# Patient Record
Sex: FEMALE | Race: WHITE | NOT HISPANIC OR LATINO | Employment: UNEMPLOYED | URBAN - METROPOLITAN AREA
[De-identification: names, ages, dates, MRNs, and addresses within clinical notes are randomized per-mention and may not be internally consistent; named-entity substitution may affect disease eponyms.]

---

## 2017-07-03 RX ORDER — CIPROFLOXACIN 250 MG/1
500 TABLET, FILM COATED ORAL EVERY 12 HOURS
Status: DISCONTINUED | OUTPATIENT
Start: 2017-07-03 | End: 2017-07-03

## 2017-07-03 RX ORDER — CIPROFLOXACIN 500 MG/1
500 TABLET ORAL EVERY 12 HOURS
Qty: 28 TABLET | Refills: 0 | Status: SHIPPED | OUTPATIENT
Start: 2017-07-03 | End: 2017-07-17

## 2017-09-05 ENCOUNTER — TELEPHONE (OUTPATIENT)
Dept: SURGERY | Facility: CLINIC | Age: 37
End: 2017-09-05

## 2017-09-05 NOTE — TELEPHONE ENCOUNTER
----- Message from Ernestina Real MA sent at 9/5/2017 10:35 AM CDT -----  Contact: Home: [phone]   Pt has finished her antibiotics and still has urgency and pressure in her bowels.     Home: [phone]

## 2017-09-29 ENCOUNTER — TELEPHONE (OUTPATIENT)
Dept: ENDOSCOPY | Facility: HOSPITAL | Age: 37
End: 2017-09-29

## 2017-09-29 ENCOUNTER — OFFICE VISIT (OUTPATIENT)
Dept: SURGERY | Facility: CLINIC | Age: 37
End: 2017-09-29
Payer: COMMERCIAL

## 2017-09-29 VITALS
HEART RATE: 75 BPM | SYSTOLIC BLOOD PRESSURE: 110 MMHG | WEIGHT: 190.69 LBS | DIASTOLIC BLOOD PRESSURE: 65 MMHG | BODY MASS INDEX: 37.44 KG/M2 | HEIGHT: 60 IN

## 2017-09-29 DIAGNOSIS — K91.850 POUCHITIS: Primary | ICD-10-CM

## 2017-09-29 DIAGNOSIS — R19.8 RECTAL PRESSURE: Primary | ICD-10-CM

## 2017-09-29 DIAGNOSIS — K91.850 ILEAL POUCHITIS: Primary | ICD-10-CM

## 2017-09-29 PROCEDURE — 99213 OFFICE O/P EST LOW 20 MIN: CPT | Mod: S$GLB,,, | Performed by: COLON & RECTAL SURGERY

## 2017-09-29 PROCEDURE — 99999 PR PBB SHADOW E&M-EST. PATIENT-LVL III: CPT | Mod: PBBFAC,,, | Performed by: COLON & RECTAL SURGERY

## 2017-09-29 RX ORDER — TRAMADOL HYDROCHLORIDE 50 MG/1
50 TABLET ORAL EVERY 6 HOURS PRN
Qty: 50 TABLET | Refills: 0 | Status: SHIPPED | OUTPATIENT
Start: 2017-09-29 | End: 2017-10-09

## 2017-09-29 NOTE — PROGRESS NOTES
Subjective:       Patient ID: Cara Manriquez is a 37 y.o. female.    Chief Complaint: Abdominal Pain (Pt reports an frequent urge to make bowel movements, pressure and burning sensation in the abdomen.)    HPI established patient.  History of ileal anal pouch anastomosis for UC.  She's had episodes of pouchitis previously which typically respond to single agent antibiotics.  She was recently placed on Cipro but has had ongoing pelvic discomfort and change in bowel function.    Review of Systems   Constitutional: Negative for chills and fever.   Respiratory: Negative for cough.    Genitourinary: Negative for dysuria and urgency.   Neurological: Negative for seizures and numbness.       Objective:      Physical Exam   Constitutional: She is oriented to person, place, and time. She appears well-developed and well-nourished.   Eyes: Conjunctivae are normal.   Abdominal: Soft. She exhibits no mass. There is no tenderness. No hernia.   Musculoskeletal: Normal range of motion. She exhibits no edema.   Neurological: She is alert and oriented to person, place, and time.       Assessment:       1. Ileal pouchitis        Plan:       She prefers to have pouchoscopy with sedation.  Because her symptoms are not typical of her pouchitis previously I have also suggested pelvic MRI

## 2017-10-05 ENCOUNTER — ANESTHESIA EVENT (OUTPATIENT)
Dept: ENDOSCOPY | Facility: HOSPITAL | Age: 37
End: 2017-10-05
Payer: COMMERCIAL

## 2017-10-05 ENCOUNTER — HOSPITAL ENCOUNTER (OUTPATIENT)
Facility: HOSPITAL | Age: 37
Discharge: HOME | End: 2017-10-05
Attending: COLON & RECTAL SURGERY | Admitting: COLON & RECTAL SURGERY
Payer: COMMERCIAL

## 2017-10-05 ENCOUNTER — SURGERY (OUTPATIENT)
Age: 37
End: 2017-10-05

## 2017-10-05 ENCOUNTER — HOSPITAL ENCOUNTER (OUTPATIENT)
Dept: RADIOLOGY | Facility: HOSPITAL | Age: 37
Discharge: HOME | End: 2017-10-05
Attending: COLON & RECTAL SURGERY
Payer: COMMERCIAL

## 2017-10-05 ENCOUNTER — ANESTHESIA (OUTPATIENT)
Dept: ENDOSCOPY | Facility: HOSPITAL | Age: 37
End: 2017-10-05
Payer: COMMERCIAL

## 2017-10-05 VITALS
HEIGHT: 60 IN | HEART RATE: 72 BPM | RESPIRATION RATE: 20 BRPM | OXYGEN SATURATION: 100 % | TEMPERATURE: 98 F | BODY MASS INDEX: 36.52 KG/M2 | WEIGHT: 186 LBS | DIASTOLIC BLOOD PRESSURE: 78 MMHG | SYSTOLIC BLOOD PRESSURE: 100 MMHG | RESPIRATION RATE: 18 BRPM

## 2017-10-05 DIAGNOSIS — R19.8 RECTAL PRESSURE: ICD-10-CM

## 2017-10-05 DIAGNOSIS — K91.850 ILEAL POUCHITIS: Primary | ICD-10-CM

## 2017-10-05 DIAGNOSIS — K91.850 POUCHITIS: ICD-10-CM

## 2017-10-05 LAB
B-HCG UR QL: NEGATIVE
CTP QC/QA: YES

## 2017-10-05 PROCEDURE — 63600175 PHARM REV CODE 636 W HCPCS: Performed by: NURSE ANESTHETIST, CERTIFIED REGISTERED

## 2017-10-05 PROCEDURE — 44386 ENDOSCOPY BOWEL POUCH/BIOP: CPT | Performed by: COLON & RECTAL SURGERY

## 2017-10-05 PROCEDURE — 37000009 HC ANESTHESIA EA ADD 15 MINS: Performed by: COLON & RECTAL SURGERY

## 2017-10-05 PROCEDURE — 25000003 PHARM REV CODE 250: Performed by: COLON & RECTAL SURGERY

## 2017-10-05 PROCEDURE — 88305 TISSUE EXAM BY PATHOLOGIST: CPT | Mod: 26,,, | Performed by: PATHOLOGY

## 2017-10-05 PROCEDURE — 88305 TISSUE EXAM BY PATHOLOGIST: CPT | Performed by: PATHOLOGY

## 2017-10-05 PROCEDURE — 72195 MRI PELVIS W/O DYE: CPT | Mod: TC

## 2017-10-05 PROCEDURE — 37000008 HC ANESTHESIA 1ST 15 MINUTES: Performed by: COLON & RECTAL SURGERY

## 2017-10-05 PROCEDURE — 72195 MRI PELVIS W/O DYE: CPT | Mod: 26,,, | Performed by: RADIOLOGY

## 2017-10-05 PROCEDURE — D9220A PRA ANESTHESIA: Mod: ANES,,, | Performed by: ANESTHESIOLOGY

## 2017-10-05 PROCEDURE — D9220A PRA ANESTHESIA: Mod: CRNA,,, | Performed by: NURSE ANESTHETIST, CERTIFIED REGISTERED

## 2017-10-05 PROCEDURE — 81025 URINE PREGNANCY TEST: CPT | Performed by: COLON & RECTAL SURGERY

## 2017-10-05 PROCEDURE — 44386 ENDOSCOPY BOWEL POUCH/BIOP: CPT | Mod: ,,, | Performed by: COLON & RECTAL SURGERY

## 2017-10-05 PROCEDURE — 27201012 HC FORCEPS, HOT/COLD, DISP: Performed by: COLON & RECTAL SURGERY

## 2017-10-05 RX ORDER — PROPOFOL 10 MG/ML
VIAL (ML) INTRAVENOUS
Status: DISCONTINUED | OUTPATIENT
Start: 2017-10-05 | End: 2017-10-05

## 2017-10-05 RX ORDER — LIDOCAINE HCL/PF 100 MG/5ML
SYRINGE (ML) INTRAVENOUS
Status: DISCONTINUED | OUTPATIENT
Start: 2017-10-05 | End: 2017-10-05

## 2017-10-05 RX ORDER — SODIUM CHLORIDE 9 MG/ML
INJECTION, SOLUTION INTRAVENOUS CONTINUOUS
Status: DISCONTINUED | OUTPATIENT
Start: 2017-10-05 | End: 2017-10-05 | Stop reason: HOSPADM

## 2017-10-05 RX ORDER — METRONIDAZOLE 500 MG/1
500 TABLET ORAL EVERY 8 HOURS
Qty: 42 TABLET | Refills: 0 | Status: SHIPPED | OUTPATIENT
Start: 2017-10-05 | End: 2017-10-19

## 2017-10-05 RX ORDER — CIPROFLOXACIN 500 MG/1
500 TABLET ORAL 2 TIMES DAILY
Qty: 28 TABLET | Refills: 0 | Status: SHIPPED | OUTPATIENT
Start: 2017-10-05 | End: 2017-10-19

## 2017-10-05 RX ADMIN — PROPOFOL 100 MG: 10 INJECTION, EMULSION INTRAVENOUS at 11:10

## 2017-10-05 RX ADMIN — SODIUM CHLORIDE: 0.9 INJECTION, SOLUTION INTRAVENOUS at 11:10

## 2017-10-05 RX ADMIN — LIDOCAINE HYDROCHLORIDE 50 MG: 20 INJECTION, SOLUTION INTRAVENOUS at 11:10

## 2017-10-05 RX ADMIN — PROPOFOL 50 MG: 10 INJECTION, EMULSION INTRAVENOUS at 11:10

## 2017-10-05 NOTE — PATIENT INSTRUCTIONS
Discharge Summary/Instructions after an Endoscopic Procedure  Patient Name: Cara Manriquez  Patient MRN: [id number]  Patient YOB: 1980  Thursday, October 05, 2017  Sánchez Barcenas MD  RESTRICTIONS:  During your procedure today, you received medications for sedation.  These   medications may affect your judgment, balance and coordination.  Therefore,   for 24 hours, you have the following restrictions:   - DO NOT drive a car, operate machinery, make legal/financial decisions,   sign important papers or drink alcohol.    ACTIVITY:  The following day: return to full activity including work, except no heavy   lifting, straining or running for 3 days if polyps were removed.  DIET:  Eat and drink normally unless instructed otherwise.     TREATMENT FOR COMMON SIDE EFFECTS:  - Mild abdominal pain, belching, bloating or excessive gas: rest, eat   lightly and use a heating pad.  - Sore Throat: treat with throat lozenges and/or gargle with warm salt   water.  SYMPTOMS TO WATCH FOR AND REPORT TO YOUR PHYSICIAN:  1. Abdominal pain or bloating, other than gas cramps.  2. Chest pain.  3. Back pain.  4. Chills or fever occurring within 24 hours after the procedure.  5. Rectal bleeding, which would show as bright red, maroon, or black stools.   (A tablespoon of blood from the rectum is not serious, especially if   hemorrhoids are present.)  6. Vomiting.  7. Weakness or dizziness.  8. Because air was used during the procedure, expelling large amounts of air   from your rectum or belching is normal.  9. If a bowel prep was taken, you may not have a bowel movement for 1-3   days.  This is normal.  GO DIRECTLY TO THE NEAREST EMERGENCY ROOM IF YOU HAVE ANY OF THE FOLLOWING:      Difficulty breathing  Chills and/or fever over 101 F   Persistent vomiting and/or vomiting blood   Severe abdominal pain   Severe chest pain   Black, tarry stools   Bleeding- more than one tablespoon  Your doctor recommends these additional  instructions:  If any biopsies were taken, your doctors clinic will contact you in 1 to 2   weeks with any results.  You are being discharged to home.   Resume your regular diet indefinitely.   Take Flagyl (metronidazole) 500 mg by mouth three times a day for two weeks.     Take Cipro (ciprofloxacin) 500 mg by mouth twice a day for two weeks.   An appointment has been made (or make an appointment) to see a   gastroenterologist at appointment to be scheduled.  For questions, problems or results please call your physician - Sánchez Barcenas MD at Work:  [phone].  [facility] PHONE NUMBER: [phone]  IF A COMPLICATION OR EMERGENCY SITUATION ARISES AND YOU ARE UNABLE TO REACH   YOUR PHYSICIAN - GO DIRECTLY TO THE EMERGENCY ROOM.  Sánchez Barcenas MD  10/5/2017 12:10:46 PM  This report has been verified and signed electronically.

## 2017-10-05 NOTE — ANESTHESIA POSTPROCEDURE EVALUATION
Anesthesia Post Evaluation    Patient: Cara Manriquez    Procedure(s) Performed: Procedure(s) (LRB):  POUCHOSCOPY (N/A)    Final Anesthesia Type: general  Patient location during evaluation: PACU  Patient participation: Yes- Able to Participate  Level of consciousness: awake and alert  Post-procedure vital signs: reviewed and stable  Pain management: adequate  Airway patency: patent  PONV status at discharge: No PONV  Anesthetic complications: no      Cardiovascular status: blood pressure returned to baseline  Respiratory status: spontaneous ventilation and room air  Hydration status: euvolemic  Follow-up not needed.        Visit Vitals  /78 (BP Location: Left arm)   Pulse 72   Temp 36.5 °C (97.7 °F)   Resp 18   Ht 5' (1.524 m)   Wt 84.4 kg (186 lb)   SpO2 100%   Breastfeeding? No   BMI 36.33 kg/m²       Pain/Maureen Score: Pain Assessment Performed: Yes (10/5/2017 11:07 AM)  Presence of Pain: denies (10/5/2017 12:12 PM)  Maureen Score: 10 (10/5/2017 12:19 PM)

## 2017-10-05 NOTE — TRANSFER OF CARE
Anesthesia Transfer of Care Note    Patient: Cara Manriquez    Procedure(s) Performed: Procedure(s) (LRB):  POUCHOSCOPY (N/A)    Patient location: PACU    Anesthesia Type: general    Transport from OR: Transported from OR on room air with adequate spontaneous ventilation    Post pain: adequate analgesia    Post assessment: no apparent anesthetic complications    Post vital signs: stable    Level of consciousness: responds to stimulation and sedated    Nausea/Vomiting: no nausea/vomiting    Complications: none    Transfer of care protocol was followed      Last vitals:   Visit Vitals  /69 (BP Location: Left arm, Patient Position: Lying)   Pulse 95   Temp 37.1 °C (98.8 °F) (Temporal)   Resp 18   Ht 5' (1.524 m)   Wt 84.4 kg (186 lb)   SpO2 96%   Breastfeeding? No   BMI 36.33 kg/m²

## 2017-10-05 NOTE — ANESTHESIA PREPROCEDURE EVALUATION
10/05/2017  Cara Manriquez is a 37 y.o., female.    Anesthesia Evaluation         Review of Systems  Anesthesia Hx:  No problems with previous Anesthesia   Social:  Non-Smoker, No Alcohol Use    Cardiovascular:  Cardiovascular Normal     Pulmonary:  Pulmonary Normal    Hepatic/GI:   Ulcerative colitis, chronic       Physical Exam  General:  Obesity    Airway/Jaw/Neck:  Airway Findings: Mouth Opening: Normal Tongue: Normal  General Airway Assessment: Adult            Mental Status:  Mental Status Findings:         Anesthesia Plan  Type of Anesthesia, risks & benefits discussed:  Anesthesia Type:  general  Patient's Preference:   Intra-op Monitoring Plan: standard ASA monitors  Intra-op Monitoring Plan Comments:   Post Op Pain Control Plan:   Post Op Pain Control Plan Comments:   Induction:   IV  Beta Blocker:  Patient is not currently on a Beta-Blocker (No further documentation required).       Informed Consent: Patient understands risks and agrees with Anesthesia plan.  Questions answered. Anesthesia consent signed with patient.  ASA Score: 1     Day of Surgery Review of History & Physical:    H&P update referred to the surgeon.         Ready For Surgery From Anesthesia Perspective.

## 2017-10-05 NOTE — PROGRESS NOTES
Patient ambulated to restroom with minimal assistance. Noted romero red blood , Dr Barcenas aware. Informed patient per Dr Barcenas, bx sites may bleed. Patient and  awaiting to speak with Dr Barcenas.

## 2017-10-05 NOTE — H&P
Endoscopy H&P    Procedure : pouchoscopy    Abdominal pain, suspected pouchitis    Past Medical History:   Diagnosis Date    Ulcerative colitis      Sedation Problems: NO  Family History   Problem Relation Age of Onset    Ulcerative colitis Neg Hx     Crohn's disease Neg Hx     Colon cancer Neg Hx      Fam Hx of Sedation Problems: NO  Social History     Social History    Marital status:      Spouse name: N/A    Number of children: N/A    Years of education: N/A     Occupational History    Not on file.     Social History Main Topics    Smoking status: Never Smoker    Smokeless tobacco: Never Used    Alcohol use No    Drug use: No    Sexual activity: Not on file     Other Topics Concern    Not on file     Social History Narrative    No narrative on file       Review of Systems - Negative except     Respiratory ROS: no cough, shortness of breath, or wheezing  Cardiovascular ROS: negative  Gastrointestinal ROS: negative  Musculoskeletal ROS: negative  Neurological ROS: negative        Physical Exam:  General: no distress  Head: normocephalic  Airway:  normal oropharynx, airway normal  Neck: supple, symmetrical, trachea midline  Lungs:  clear to auscultation bilaterally and normal respiratory effort  Heart: regular rate and rhythm, S1, S2 normal, no murmur, rub or gallop  Abdomen: soft, non-tender non-distented; bowel sounds normal; no masses,  no organomegaly  Extremities: no cyanosis or edema, or clubbing       Deep Sedation: Mallampati Score per anesthesia     SedationPlan :Gen     ASA : II

## 2017-10-12 ENCOUNTER — TELEPHONE (OUTPATIENT)
Dept: ENDOSCOPY | Facility: HOSPITAL | Age: 37
End: 2017-10-12

## 2017-10-26 ENCOUNTER — TELEPHONE (OUTPATIENT)
Dept: GASTROENTEROLOGY | Facility: CLINIC | Age: 37
End: 2017-10-26

## 2017-10-26 NOTE — TELEPHONE ENCOUNTER
Patient is scheduled for a new patient clinic appointment with Dr. CESAR Stoddard on 10/30/2017. Appointment reminder, campus map, as well as a new patient welcome letter were e-mailed to patient.

## 2017-10-27 NOTE — PROGRESS NOTES
[facility]          Inflammatory Bowel Disease New Patient Consultation Note            Dr. Marla Stoddard    TODAY'S VISIT DATE:  10/27/2017    Reason for Consult:    Crohn's disease    PCP: Primary Doctor No  [id number] [facility] [address]    Referring MD:   Dr. Sánchez Barcenas    History of Present Illness:    Dear. Dr. Sánchez Barcenas    Thank you for requesting a consultation on your patient Cara Manriquez who is a 37 y.o. female seen today at the [facility] on 10/27/2017 for inflammatory bowel disease- ulcerative colitis with possible Crohn's disease of the J pouch. Pertinent past medical history includes possible new onset diagnosis of thyroid disease (per patient history- TSH slightly elevated, TPO high)    As you know, Cara Manriquez was doing well until 1991 when she developed symptoms of bloody diarrhea, abdominal pain, weight loss and was diagnosed with ulcerative colitis by colonoscopy.  She had several flares of her UC with recurrent use of prednisone which was generally helpful but by [id number] this was not effective and she had continued symptoms.  IN the past the only thing that would resolve her bleeding was prednisone. She saw Dr. Sheppard in 5/2013 at which time she was on sulfasalazine.  In regards to other maintenance meds she had failed asacol, colazal, azathioprine, remicade, rowasa enemas, canasa suppositories, cortenemas, MTX, flagyl and entocort. Colonoscopy done in 5/2013 by Dr. Sheppard showed severe diffuse pancolitis with normal TI and no evidence of CMV.  Due to medically refractory disease and severity she saw Dr. Barcenas in 2013 underwent a 2 step restorative proctocolectomy with IPAA (7/9/13, 9/10/2013) with pathology consistent with severe inflammation consistent with severe ulcerative colitis and normal ileum and appendix with fibrous obliteration. Her postoperative course was complicated by some nausea and a  "presacral abscess with CT guided drainage about 3 weeks after the surgery by IR with drain removed a week later. Pouchogram was normal with no anastomotic leak so patient underwent ileostomy closure on 9/10/13.  Pathology showed only reactive changes of the SB.  At her postop visit on 9/25/13 she reported some rectal pressure/discomfort, perianal itching so she was prescribed calmoseptine cream daily, miralax/enema for constipation, fiber-con and was told to stop immodium she had been taking.  By 12/2013 she was doing well.      She then began with "pouchitis" symptoms soon after surgery. This would manifest with increase diarrhea with frequency, urgency and nocturnal bowel movements and anal leakage. She would also have a significant amount of rectal pressure with only heating pads helping this. She has taken about 4 courses of antibiotics for these symptoms with most recent course of ciprofloxacin yesterday. She had flagyl once and this last time was given flagyl with the cipro but she stopped it 3 days early due to abdominal pain from it. She has tried probiotics including culturelle for about a month in 2017. Not sure if it helped because her pouchitis symptoms are infrequent. In 8/2015 she had some rectal bleeding with anorectal exam showing perianal skin tag with ulceration.   Dr. Barcenas performed EUA with pouchoscopy normal with  excision of the perianal skin tag on 9/4/15 which was consistent with fibroepithelial polyp which resolved her rectal bleeding. She was given lidocaine 5% and norco.  She was doing well at her postoperative visit 2/2016 with plans for pouchoscopy one year later.  Dr. Barcenas again saw the patient on 9/29/17 at which time she reported abdominal pain with urgency with severe rectal pressure (heating pad helpedand abdominal burning.  MRI pelvis on 10/11/17 showed mild increase in the anteroposterior width of the levator hiatus on rest without significant change on straining with mild " descent of the levator plate on straining.  Pouchoscopy on 10/12/17 showed inflammation in the ileoanal pouch and abdifatah-terminal ileum with biopsies consistent with mild active chronic ileitis.    Currently she is having 10 watery to soft BMs/day with no urgency, nocturnal bowel movements. She had some mid abdominal achy pain that started with antibiotics and does not have it today but had it yesterday. The pain is non-radiating constant pain.  Again this has improved after stopping flagyl and finishing her ciprofloxacin.  Patient has no other gastrointestinal/constitutional complaints including no fevers or chills, weight loss, nausea/vomiting (including no hematemesis), dysphagia. Also patient does not have any extraintestinal manifestations of their inflammatory bowel disease including no eye pain/redness, skin lesions/rashes, joint problems, oral ulcers.    Prior Pertinent Surgeries:   2013: laparoscopic ileal pouch anal anastomosis (path: severe active UC of colon segments, involving the distal margin of the ileum colon segment, both proximal and distal margins of the second segment of colon, the ileum margin is free of active inflammation, appendix with fibrous obliteration  2013: CT guided abscess drain with 8Fr drain placed  9/10/2013: ileostomy closure (path: small bowel with reactive change)  2015: excision of perianal skin tag (path: fibroepithelial polyp)    Pertinent Endoscopy/Imagin2013 Colonoscopy: diffuse severe inflammation from the rectum to transverse colon; normal ileum (path-ascending & transverse: normal) (path-descending: chronic active colitis)   2013 CT abd/pelvis: presacral abscess; postsurgical changes c/w total colectomy; J-pouch formation, and RLQ ileostomy  2013 Pouchogram: no anastomotic leak  2015 pouchoscopy: healthy appearing with good health mucosa, no lesions, no inflammation  10/11/2017 MRI Pelvis: mild increase in the anteroposterior width of  the levator hiatus on rest without significant change on straining with mild descent of the levator plate on straining  10/12/2017 pouchoscopy: inflammation in the ileoanal pouch and abdifatah-terminal ileum secondary to pouchitis (path-ileum, afferent limb: mild active chronic ileitis) (path-ileum, proximal neoterminal: mild active ileitis with expansion of the lamina propria) (path-pouch: mild active enteritis with expansion of the lamina propria)    Pertinent Labs:  Lab Results   Component Value Date    CDIFFICILEAN Negative 05/01/2013    CDIFFTOX Negative 05/01/2013     Lab Results   Component Value Date    CRP 2.0 05/01/2013     Prior IBD Therapies before surgery:  Prednisone (effective, lost response over the years)  Entocort (possibly effective)  MTX (unsure of oral or SC)  Cortenemas (ineffective)  Canasa (ineffective)  Flagyl (ineffective)  Rowasa  colazal (ineffective)  Imuran 200 mg (ineffective)  Remicade (2003--ineffective)  Apriso 800 mg TID  Sulfasalazine 3 gm/day    Prior IBD Therapies after surgery- J pouch:  Flagyl- ?abdominal pain  cipro- effective    Current IBD Therapies:  None    Vaccinations:  Flu shot:  Recommended yearly  Chickenpox status/Varicella vaccine:  Had chickenpox as a child  No results found for: VARICELLAZOS, VARICELLAINT  Tetanus: not sure  Prevnar 13 vaccine: never had  Pneumovax 23 vaccine: never had  Hepatitis B: not sure  No results found for: HEPBSAB  Hepatitis A: not sure  HPV: NA   Meningococcal: NA     NSAID use/indication:  Yes- motrin/ibuprofen once a month    Narcotic use:  No    Alternative/Complementary Meds for IBD:  No    Review of Systems   Constitutional: Positive for chills. Negative for fever and weight loss.   HENT:        No oral ulcers, dysphagia, oral thrush   Eyes: Negative for blurred vision, pain and redness.   Respiratory: Negative for cough and shortness of breath.    Cardiovascular: Negative for chest pain.   Gastrointestinal: Positive for abdominal pain.  Negative for heartburn, nausea and vomiting.   Genitourinary: Negative for dysuria and hematuria.   Musculoskeletal: Negative for back pain and joint pain.   Skin: Negative for rash.   Psychiatric/Behavioral: Negative for depression. The patient is not nervous/anxious and does not have insomnia.      Medical/Surgical History:    has a past medical history of Ulcerative colitis.   has a past surgical history that includes Colonoscopy;  x 2; Tubal ligation; Colon surgery; and Colon surgery (9/10/13).    Family History: family history- nothing significant    Social History:  reports that she has never smoked. She has never used smokeless tobacco. She reports that she does not drink alcohol or use drugs.    Review of patient's allergies indicates:  No Known Allergies    Medications:  None    Vital Signs:  /73 (BP Location: Left arm, Patient Position: Sitting)   Pulse 69 Comment: O2: 99%  Temp 98.1 °F (36.7 °C)   Ht 5' (1.524 m)   Wt 86 kg (189 lb 9.5 oz)   BMI 37.03 kg/m²     Physical Exam   Constitutional: She is oriented to person, place, and time. She appears well-developed.   HENT:   Mouth/Throat: Oropharynx is clear and moist. No oral lesions.   No oral ulcers or thrush   Eyes: Conjunctivae are normal. Pupils are equal, round, and reactive to light.   Cardiovascular: Normal rate and regular rhythm.    Pulmonary/Chest: Effort normal and breath sounds normal.   Abdominal: Soft. There is no tenderness.   Musculoskeletal:        Right lower leg: She exhibits no swelling.        Left lower leg: She exhibits no swelling.   Neurological: She is alert and oriented to person, place, and time.   Skin: No rash noted.   Psychiatric: She has a normal mood and affect.   Nursing note and vitals reviewed.    Labs: labs reviewed above    Assessment/Plan:  Cara Manriquez is a 37 y.o. female with Inflammatory Bowel Disease initially ulcerative colitis with possible Crohn's disease of the J pouch s/p a 2 step  "restorative proctocolectomy with IPAA (7/9/13, 9/10/2013) due to medically refractory disease.  She has a past medical history of possible new onset diagnosis of thyroid disease (per patient history- TSH slightly elevated, TPO high).  She initially developed symptoms of bloody diarrhea, abdominal pain, and weight loss in 1991 and was diagnosed with ulcerative colitis by colonoscopy.  She had several flares of her UC with recurrent use of prednisone which was generally helpful but by [id number] this was not effective and she had continued symptoms.  She saw Dr. Sheppard in 5/2013 at which time she was on sulfasalazine and had previously failed asacol, colazal, azathioprine, remicade, rowasa enemas, canasa suppositories, cortenemas, MTX, flagyl and entocort. Colonoscopy done in 5/2013 by Dr. Sheppard showed severe diffuse pancolitis with normal TI and no evidence of CMV.  Due to medically refractory disease and severity she saw Dr. Barcenas in 2013 underwent a 2 step restorative proctocolectomy with IPAA on 7/9/2013 with pathology consistent with severe inflammation consistent with severe ulcerative colitis and normal ileum and appendix with fibrous obliteration.  Post-op was complicated by a presacral abscess 3 weeks after surgery that required IR drainage.  She had a pouchogram that showed no anastomotic leak so she proceeded with an ileostomy takedown on 9/10/2013.  She had some rectal pressure/discomfort, perianal itching at her post-op visit that was treated with calmoseptine cream and miralax/enema for constipation, and fiber-con and by 12/2013 she was doing well.  Soon after surgery she began with "pouchitis" symptoms of frequent diarrhea, urgency, nocturnal bowel movements, anal leakage, and rectal pressure that was treated with flagyl.  She has approximately 3 additional recurrent episodes of "pouchitis" since surgery that was treated with and resolved with ABX with her most recent episode in 9/2017 that was treated " with cipro/flagyl though patient only took cipro.  She also had a perianal skin tag that was excised in 9/4/2015.  MRI pelvis on 10/11/17 showed mild increase in the anteroposterior width of the levator hiatus on rest without significant change on straining with mild descent of the levator plate on straining.  Her most recent pouchoscopy on 10/12/17 showed inflammation in the ileoanal pouch and abdifatah-terminal ileum with biopsies consistent with mild active chronic ileitis.  Currently, she is having 10 watery to soft BMs/day with no urgency, nocturnal bowel movements with some intermittent mid achy abdominal pain which has improved after her course of ABX and is now back to her baseline.  She is currently on no medications for her IBD.       Patient has pouchitis vs Crohn's disease of the J pouch. What makes me suspicious of Crohn's disease is due to the extent of the ulcers involving the neoterminal ileum however her symptoms are so infrequent and has completely resolved after a course of cipro I don't see any long term treatments needed. When she recurs with symptoms she will call us and we will do an urgent pouchoscopy for evaluation and will do a routine one in 4 mos unless symptoms change. I would have liked to do it sooner to see response to recent treatment but patient prefers to defer this right now.  I also don't think long term treatment for possible Crohn's disease is needed at this time.     # Pouchitis vs Crohn's disease of J pouch-------Inflammatory Bowel Disease unspecified (initially ulcerative colitis with possible Crohn's disease of the J pouch s/p a 2 step restorative proctocolectomy with IPAA (7/9/13, 9/10/2013):    - I had a long discussion with patient regarding epidemiology, potential causes/triggers (avoiding NSAIDS, antibiotics if possible, stress), diagnosis, management goals and treatment options   - patient to contact us for any recurrent symptoms  - pouchoscopy to be planned for 4 mos from  now (can do earlier but pt feeling well and wishes to defer)  - if recurrent pouchitis in future we can consider probiotics or prebiotics but if symptoms recur pouchoscopy would help guide more aggressive treatment for Crohn's would be required  - her symptoms are so infrequent and all symptoms have resolved after recent cipro use that I don't think at this time she needs treatment.   - basic labs: CBC, CMP, ESR, CRP, vitamin B12, vitamin D, celiac testing  - drug monitoring labs: None needed    # Possible hypothyroidism:   - per patient history-no records-TSH slightly elevated, TPO high  - TSH and Free T4  to assess for thyroid disease    # IBD specific health maintenance:  Colorectal cancer risk:    Risks factors: none-average risk  - colonoscopy:  total colectomy, routine surveillance of rectal cuff    Pap smear recommended every three years--no immunisuppression  Opthamologic exam recommended yearly    Dermatologic exam recommended yearly     Bone health:  Risk of osteopenia/osteoporosis:  Risks factors: none  Vitamin D: vitamin D level ordered today  No results found for: [id number]    Vaccine counseling:  - VZV IgG, HBsAb today   - Tetanus every 10 years recommended  - Flu shot yearly recommended  - Pneumonia 13&23 vaccines recommended if plans for immunosuppression  - Hepatitis A & B series    Follow up: MARIA A Goldman same afternoon as pouchoscopy on a Tuesday or Thursday    Total visit time was 60 minutes, more than 50% of which was spent in face-to-face counseling with patient regarding evaluation and management goals and treatment options for pouchitis vs Crohn's of the J pouch.     Thank you again for sending Cara Manriquez to see Dr. Marla Stoddard today at the [facility]. Please don't hesitate to contact Dr. Stoddard if there are any questions regarding this evaluation, or if you have any other patients with inflammatory bowel disease for whom you would like a consultation. You can  reach Dr. Stoddard at [phone] or by email at [email or web address]    Marla Stoddard MD  Department of Gastroenterology  Medical Director, Inflammatory Bowel Disease    TRINH Seth   Department of Gastroenterology  Inflammatory Bowel Disease

## 2017-10-30 ENCOUNTER — TELEPHONE (OUTPATIENT)
Dept: GASTROENTEROLOGY | Facility: CLINIC | Age: 37
End: 2017-10-30

## 2017-10-30 ENCOUNTER — OFFICE VISIT (OUTPATIENT)
Dept: GASTROENTEROLOGY | Facility: CLINIC | Age: 37
End: 2017-10-30
Payer: COMMERCIAL

## 2017-10-30 ENCOUNTER — LAB VISIT (OUTPATIENT)
Dept: LAB | Facility: HOSPITAL | Age: 37
End: 2017-10-30
Attending: INTERNAL MEDICINE
Payer: COMMERCIAL

## 2017-10-30 ENCOUNTER — PATIENT MESSAGE (OUTPATIENT)
Dept: GASTROENTEROLOGY | Facility: CLINIC | Age: 37
End: 2017-10-30

## 2017-10-30 VITALS
BODY MASS INDEX: 37.23 KG/M2 | HEART RATE: 69 BPM | DIASTOLIC BLOOD PRESSURE: 73 MMHG | SYSTOLIC BLOOD PRESSURE: 115 MMHG | WEIGHT: 189.63 LBS | HEIGHT: 60 IN | TEMPERATURE: 98 F

## 2017-10-30 DIAGNOSIS — K52.9 INFLAMMATORY BOWEL DISEASE: ICD-10-CM

## 2017-10-30 DIAGNOSIS — K91.850 POUCHITIS: ICD-10-CM

## 2017-10-30 DIAGNOSIS — E03.9 HYPOTHYROIDISM, UNSPECIFIED TYPE: ICD-10-CM

## 2017-10-30 DIAGNOSIS — K52.9 INFLAMMATORY BOWEL DISEASE: Primary | ICD-10-CM

## 2017-10-30 LAB
25(OH)D3+25(OH)D2 SERPL-MCNC: 33 NG/ML
ALBUMIN SERPL BCP-MCNC: 3.6 G/DL
ALP SERPL-CCNC: 113 U/L
ALT SERPL W/O P-5'-P-CCNC: 12 U/L
ANION GAP SERPL CALC-SCNC: 9 MMOL/L
AST SERPL-CCNC: 15 U/L
BASOPHILS # BLD AUTO: 0.06 K/UL
BASOPHILS NFR BLD: 1 %
BILIRUB SERPL-MCNC: 0.2 MG/DL
BUN SERPL-MCNC: 9 MG/DL
CALCIUM SERPL-MCNC: 9.3 MG/DL
CHLORIDE SERPL-SCNC: 103 MMOL/L
CO2 SERPL-SCNC: 25 MMOL/L
CREAT SERPL-MCNC: 0.8 MG/DL
CRP SERPL-MCNC: 10.7 MG/L
DIFFERENTIAL METHOD: ABNORMAL
EOSINOPHIL # BLD AUTO: 0.1 K/UL
EOSINOPHIL NFR BLD: 1.7 %
ERYTHROCYTE [DISTWIDTH] IN BLOOD BY AUTOMATED COUNT: 14.6 %
ERYTHROCYTE [SEDIMENTATION RATE] IN BLOOD BY WESTERGREN METHOD: 40 MM/HR
EST. GFR  (AFRICAN AMERICAN): >60 ML/MIN/1.73 M^2
EST. GFR  (NON AFRICAN AMERICAN): >60 ML/MIN/1.73 M^2
GLUCOSE SERPL-MCNC: 101 MG/DL
HBV CORE AB SERPL QL IA: NEGATIVE
HBV SURFACE AB SER-ACNC: NEGATIVE M[IU]/ML
HBV SURFACE AG SERPL QL IA: NEGATIVE
HCT VFR BLD AUTO: 36.8 %
HCV AB SERPL QL IA: NEGATIVE
HEPATITIS A ANTIBODY, IGG: NEGATIVE
HGB BLD-MCNC: 12.1 G/DL
HIV 1+2 AB+HIV1 P24 AG SERPL QL IA: NEGATIVE
IGA SERPL-MCNC: 321 MG/DL
IMM GRANULOCYTES # BLD AUTO: 0.01 K/UL
IMM GRANULOCYTES NFR BLD AUTO: 0.2 %
LYMPHOCYTES # BLD AUTO: 1.5 K/UL
LYMPHOCYTES NFR BLD: 25.9 %
MCH RBC QN AUTO: 26 PG
MCHC RBC AUTO-ENTMCNC: 32.9 G/DL
MCV RBC AUTO: 79 FL
MONOCYTES # BLD AUTO: 0.5 K/UL
MONOCYTES NFR BLD: 7.6 %
NEUTROPHILS # BLD AUTO: 3.8 K/UL
NEUTROPHILS NFR BLD: 63.6 %
NRBC BLD-RTO: 0 /100 WBC
PLATELET # BLD AUTO: 436 K/UL
PMV BLD AUTO: 8.7 FL
POTASSIUM SERPL-SCNC: 3.8 MMOL/L
PROT SERPL-MCNC: 8.1 G/DL
RBC # BLD AUTO: 4.65 M/UL
SODIUM SERPL-SCNC: 137 MMOL/L
T4 FREE SERPL-MCNC: 0.88 NG/DL
TSH SERPL DL<=0.005 MIU/L-ACNC: 3.55 UIU/ML
VIT B12 SERPL-MCNC: 774 PG/ML
WBC # BLD AUTO: 5.95 K/UL

## 2017-10-30 PROCEDURE — 80053 COMPREHEN METABOLIC PANEL: CPT

## 2017-10-30 PROCEDURE — 99999 PR PBB SHADOW E&M-EST. PATIENT-LVL III: CPT | Mod: PBBFAC,,, | Performed by: INTERNAL MEDICINE

## 2017-10-30 PROCEDURE — 86703 HIV-1/HIV-2 1 RESULT ANTBDY: CPT

## 2017-10-30 PROCEDURE — 86706 HEP B SURFACE ANTIBODY: CPT

## 2017-10-30 PROCEDURE — 85651 RBC SED RATE NONAUTOMATED: CPT

## 2017-10-30 PROCEDURE — 82306 VITAMIN D 25 HYDROXY: CPT

## 2017-10-30 PROCEDURE — 86787 VARICELLA-ZOSTER ANTIBODY: CPT

## 2017-10-30 PROCEDURE — 86803 HEPATITIS C AB TEST: CPT

## 2017-10-30 PROCEDURE — 84439 ASSAY OF FREE THYROXINE: CPT

## 2017-10-30 PROCEDURE — 82784 ASSAY IGA/IGD/IGG/IGM EACH: CPT

## 2017-10-30 PROCEDURE — 86140 C-REACTIVE PROTEIN: CPT

## 2017-10-30 PROCEDURE — 87340 HEPATITIS B SURFACE AG IA: CPT

## 2017-10-30 PROCEDURE — 84443 ASSAY THYROID STIM HORMONE: CPT

## 2017-10-30 PROCEDURE — 85025 COMPLETE CBC W/AUTO DIFF WBC: CPT

## 2017-10-30 PROCEDURE — 86704 HEP B CORE ANTIBODY TOTAL: CPT

## 2017-10-30 PROCEDURE — 86790 VIRUS ANTIBODY NOS: CPT

## 2017-10-30 PROCEDURE — 99244 OFF/OP CNSLTJ NEW/EST MOD 40: CPT | Mod: S$GLB,,, | Performed by: INTERNAL MEDICINE

## 2017-10-30 PROCEDURE — 83516 IMMUNOASSAY NONANTIBODY: CPT

## 2017-10-30 PROCEDURE — 82607 VITAMIN B-12: CPT

## 2017-10-30 PROCEDURE — 36415 COLL VENOUS BLD VENIPUNCTURE: CPT

## 2017-10-30 NOTE — TELEPHONE ENCOUNTER
----- Message from Danielle Li sent at 10/30/2017  1:29 PM CDT -----  Contact: pt [phone]  Pt requests results from labs that was taken this morning. She states she is interested in thyroid results also. Pt can be reached at [phone].

## 2017-10-30 NOTE — PATIENT INSTRUCTIONS
Instructions:  - labs today  - call us in early Jan 2018 to schedule the pouchoscopy- [phone]  - pouchoscopy within next 4 months--Feb 2018 on a Tuesday or Thursday  - call immediately with any recurrent symptoms  - bring immunization records to next visit  - try to get an Endocrine appointment soon  - Avoid all NSAIDs (Advil, Ibuprofen, Motrin, Aspirin, Naprosyn, Aleve)--Tylenol OK  - Pap Smears every 3 years  - Yearly Eye exam  - Yearly Skin exam--wear sun block and hats  - sign [facility] for [facility]  - Use antibiotics with caution unless for your J pouch  - Vaccines needed:  Flu shot yearly  Tetanus every 10 years  Hepatitis A & B series   - Follow up with MARIA A Goldman same afternoon as pouchoscopy on a Tuesday or Thursday

## 2017-10-30 NOTE — LETTER
October 30, 2017      Sánchez Barcenas MD  [address]  [facility] [address]           [facility]  [address]  [facility] [address]  Phone: [phone]  Fax: [phone]          Patient: Cara Manriquez   MR Number: [id number]   YOB: 1980   Date of Visit: 10/30/2017       Dear Dr. Sánchez Barcenas:    Thank you for referring Cara Manriquez to me for evaluation. Attached you will find relevant portions of my assessment and plan of care.    If you have questions, please do not hesitate to call me. I look forward to following Cara Manriquez along with you.    Sincerely,    Marla Stoddard MD    Enclosure  CC:  No Recipients    If you would like to receive this communication electronically, please contact [email or web address] or [phone] to request more information on [facility] access.    For providers and/or their staff who would like to refer a patient to [facility], please contact us through our one-stop-shop provider referral line, [facility], at [phone].    If you feel you have received this communication in error or would no longer like to receive these types of communications, please e-mail [email or web address]

## 2017-10-31 LAB — TTG IGA SER IA-ACNC: 4 UNITS

## 2017-11-01 LAB
VARICELLA INTERPRETATION: POSITIVE
VARICELLA ZOSTER IGG: 2.52 ISR

## 2018-01-19 ENCOUNTER — TELEPHONE (OUTPATIENT)
Dept: GASTROENTEROLOGY | Facility: CLINIC | Age: 38
End: 2018-01-19

## 2018-01-19 NOTE — TELEPHONE ENCOUNTER
Called and spoke with pt  I explained she needs a Pouchoscopy and a follow up in Feb.  I asked if she had time to get these scheduled.  She stated she knows she needs to have this done but she has a bill for over $600 for [employer] and is trying to get that down before adding more on to it.    She wants the procedure and follow up.  She is having a lot of pressure at night and urgency.  She can have anywhere from 2 - 7 watery - soft Bm's at night.  Night is when it is the worse.  No blood.   She has been taking a Probiotic (she did not know the name but it is one that her pharmacy mixes) for a mth now and it does not seem to be helping.  She is ok with holding off for now and hopes that we can give her about 2 more mths to get her bill down before needing the procedure and follow up.  So she is looking around April  I told her I would send message over and I will be in touch.  She expressed understanding

## 2018-01-22 NOTE — TELEPHONE ENCOUNTER
Called pt, no answer, LM asking pt to return call in reference to conversation we had on Friday.  Left my direct line

## 2018-01-22 NOTE — TELEPHONE ENCOUNTER
Pt returned my call and lm  Called and spoke with pt  I explained Dr Stoddard was OK with her waiting until April for procedure and follow up.  I told her if she is able to get this done sooner to please call and let us know.  Otherwise I will be in touch in March to get everything scheduled for April  She expressed understanding and thanked me for calling

## 2018-03-06 ENCOUNTER — TELEPHONE (OUTPATIENT)
Dept: GASTROENTEROLOGY | Facility: CLINIC | Age: 38
End: 2018-03-06

## 2018-03-06 NOTE — TELEPHONE ENCOUNTER
Called and spoke with pt  She stated she has her bill down to about $300.  She said she will call and get her procedure scheduled within there next few wks.   I gave her schedulers number and told her it needs to be on a Tues or Thurs because we will see her same day and I gave her my direct line to call once she is scheduled so I can get follow up scheduled.  I told her if easier she can send portal message as well.   She expressed understanding

## 2018-03-27 NOTE — TELEPHONE ENCOUNTER
Pt called my direct line  She stated she was scheduled yesterday for her procedure and needed to schedule a follow up same day.  I told her the schedulers sent me a message yesterday and she was on my list to call today  Got her follow up scheduled for 04/17 @ 2:20    Appoint reminder mailed

## 2018-04-17 ENCOUNTER — LAB VISIT (OUTPATIENT)
Dept: LAB | Facility: HOSPITAL | Age: 38
End: 2018-04-17
Attending: NURSE PRACTITIONER
Payer: COMMERCIAL

## 2018-04-17 ENCOUNTER — ANESTHESIA (OUTPATIENT)
Dept: ENDOSCOPY | Facility: HOSPITAL | Age: 38
End: 2018-04-17
Payer: COMMERCIAL

## 2018-04-17 ENCOUNTER — OFFICE VISIT (OUTPATIENT)
Dept: GASTROENTEROLOGY | Facility: CLINIC | Age: 38
End: 2018-04-17
Payer: COMMERCIAL

## 2018-04-17 ENCOUNTER — ANESTHESIA EVENT (OUTPATIENT)
Dept: ENDOSCOPY | Facility: HOSPITAL | Age: 38
End: 2018-04-17
Payer: COMMERCIAL

## 2018-04-17 ENCOUNTER — SURGERY (OUTPATIENT)
Age: 38
End: 2018-04-17

## 2018-04-17 ENCOUNTER — HOSPITAL ENCOUNTER (OUTPATIENT)
Facility: HOSPITAL | Age: 38
Discharge: HOME | End: 2018-04-17
Attending: INTERNAL MEDICINE | Admitting: INTERNAL MEDICINE
Payer: COMMERCIAL

## 2018-04-17 ENCOUNTER — TELEPHONE (OUTPATIENT)
Dept: GASTROENTEROLOGY | Facility: CLINIC | Age: 38
End: 2018-04-17

## 2018-04-17 VITALS
RESPIRATION RATE: 16 BRPM | BODY MASS INDEX: 37.3 KG/M2 | DIASTOLIC BLOOD PRESSURE: 78 MMHG | SYSTOLIC BLOOD PRESSURE: 108 MMHG | HEART RATE: 76 BPM | WEIGHT: 190 LBS | TEMPERATURE: 98 F | OXYGEN SATURATION: 100 % | HEIGHT: 60 IN

## 2018-04-17 VITALS
WEIGHT: 193.81 LBS | DIASTOLIC BLOOD PRESSURE: 79 MMHG | SYSTOLIC BLOOD PRESSURE: 125 MMHG | BODY MASS INDEX: 38.05 KG/M2 | HEIGHT: 60 IN | RESPIRATION RATE: 16 BRPM | HEART RATE: 75 BPM | TEMPERATURE: 99 F

## 2018-04-17 DIAGNOSIS — K91.850 POUCHITIS: Primary | ICD-10-CM

## 2018-04-17 DIAGNOSIS — R19.7 DIARRHEA, UNSPECIFIED TYPE: ICD-10-CM

## 2018-04-17 DIAGNOSIS — K62.89 RECTAL PAIN: ICD-10-CM

## 2018-04-17 LAB
B-HCG UR QL: NEGATIVE
C DIFF GDH STL QL: NEGATIVE
C DIFF TOX A+B STL QL IA: NEGATIVE
CTP QC/QA: YES

## 2018-04-17 PROCEDURE — 88305 TISSUE EXAM BY PATHOLOGIST: CPT | Mod: 26,,, | Performed by: PATHOLOGY

## 2018-04-17 PROCEDURE — 44386 ENDOSCOPY BOWEL POUCH/BIOP: CPT | Mod: ,,, | Performed by: INTERNAL MEDICINE

## 2018-04-17 PROCEDURE — 88342 IMHCHEM/IMCYTCHM 1ST ANTB: CPT | Mod: 26,,, | Performed by: PATHOLOGY

## 2018-04-17 PROCEDURE — D9220A PRA ANESTHESIA: Mod: ANES,,, | Performed by: ANESTHESIOLOGY

## 2018-04-17 PROCEDURE — 87046 STOOL CULTR AEROBIC BACT EA: CPT

## 2018-04-17 PROCEDURE — 63600175 PHARM REV CODE 636 W HCPCS: Performed by: NURSE ANESTHETIST, CERTIFIED REGISTERED

## 2018-04-17 PROCEDURE — 37000009 HC ANESTHESIA EA ADD 15 MINS: Performed by: INTERNAL MEDICINE

## 2018-04-17 PROCEDURE — 27201012 HC FORCEPS, HOT/COLD, DISP: Performed by: INTERNAL MEDICINE

## 2018-04-17 PROCEDURE — 87427 SHIGA-LIKE TOXIN AG IA: CPT

## 2018-04-17 PROCEDURE — 88305 TISSUE EXAM BY PATHOLOGIST: CPT | Performed by: PATHOLOGY

## 2018-04-17 PROCEDURE — 99999 PR PBB SHADOW E&M-EST. PATIENT-LVL III: CPT | Mod: PBBFAC,,, | Performed by: NURSE PRACTITIONER

## 2018-04-17 PROCEDURE — D9220A PRA ANESTHESIA: Mod: CRNA,,, | Performed by: NURSE ANESTHETIST, CERTIFIED REGISTERED

## 2018-04-17 PROCEDURE — 99215 OFFICE O/P EST HI 40 MIN: CPT | Mod: S$GLB,,, | Performed by: NURSE PRACTITIONER

## 2018-04-17 PROCEDURE — 37000008 HC ANESTHESIA 1ST 15 MINUTES: Performed by: INTERNAL MEDICINE

## 2018-04-17 PROCEDURE — 87045 FECES CULTURE AEROBIC BACT: CPT

## 2018-04-17 PROCEDURE — 25000003 PHARM REV CODE 250: Performed by: INTERNAL MEDICINE

## 2018-04-17 PROCEDURE — 44386 ENDOSCOPY BOWEL POUCH/BIOP: CPT | Performed by: INTERNAL MEDICINE

## 2018-04-17 PROCEDURE — 87449 NOS EACH ORGANISM AG IA: CPT

## 2018-04-17 PROCEDURE — 81025 URINE PREGNANCY TEST: CPT | Performed by: INTERNAL MEDICINE

## 2018-04-17 RX ORDER — LIDOCAINE HCL/PF 100 MG/5ML
SYRINGE (ML) INTRAVENOUS
Status: DISCONTINUED | OUTPATIENT
Start: 2018-04-17 | End: 2018-04-17

## 2018-04-17 RX ORDER — TRAMADOL HYDROCHLORIDE 50 MG/1
25 TABLET ORAL EVERY 8 HOURS PRN
Qty: 30 TABLET | Refills: 0 | Status: ON HOLD | OUTPATIENT
Start: 2018-04-17 | End: 2023-08-14 | Stop reason: HOSPADM

## 2018-04-17 RX ORDER — PROPOFOL 10 MG/ML
VIAL (ML) INTRAVENOUS
Status: DISCONTINUED | OUTPATIENT
Start: 2018-04-17 | End: 2018-04-17

## 2018-04-17 RX ORDER — SODIUM CHLORIDE 9 MG/ML
INJECTION, SOLUTION INTRAVENOUS CONTINUOUS
Status: DISCONTINUED | OUTPATIENT
Start: 2018-04-17 | End: 2018-04-17 | Stop reason: HOSPADM

## 2018-04-17 RX ADMIN — SODIUM CHLORIDE: 0.9 INJECTION, SOLUTION INTRAVENOUS at 09:04

## 2018-04-17 RX ADMIN — PROPOFOL 40 MG: 10 INJECTION, EMULSION INTRAVENOUS at 09:04

## 2018-04-17 RX ADMIN — PROPOFOL 40 MG: 10 INJECTION, EMULSION INTRAVENOUS at 10:04

## 2018-04-17 RX ADMIN — LIDOCAINE HYDROCHLORIDE 50 MG: 20 INJECTION, SOLUTION INTRAVENOUS at 09:04

## 2018-04-17 RX ADMIN — PROPOFOL 100 MG: 10 INJECTION, EMULSION INTRAVENOUS at 09:04

## 2018-04-17 NOTE — TRANSFER OF CARE
Anesthesia Transfer of Care Note    Patient: Cara Manriquez    Procedure(s) Performed: Procedure(s) (LRB):  POUCHOSCOPY (N/A)    Patient location: PACU    Anesthesia Type: general    Transport from OR: Transported from OR on room air with adequate spontaneous ventilation    Post pain: adequate analgesia    Post assessment: no apparent anesthetic complications and tolerated procedure well    Post vital signs: stable    Level of consciousness: awake, oriented and alert    Nausea/Vomiting: no nausea/vomiting    Complications: none    Transfer of care protocol was followed      Last vitals:   Visit Vitals  /68   Pulse 76   Temp 36.7 °C (98.1 °F)   Resp 18   Ht 5' (1.524 m)   Wt 86.2 kg (190 lb)   LMP 04/03/2018 (Exact Date)   SpO2 100%   Breastfeeding? No   BMI 37.11 kg/m²

## 2018-04-17 NOTE — PATIENT INSTRUCTIONS
Instructions:  - labs today  - stool studies today--can turn in closer to home at a [facility]--give slips   - will start cipro/flagyl once we get results  - email/call us in 2 weeks with an update  - Avoid all NSAIDs (Advil, Ibuprofen, Motrin, Aspirin, Naprosyn, Aleve)  - Pap Smears recommended every 3 years with a yearly pelvic exam  - Yearly Eye exam  - Yearly Skin exam--wear sun block and hats  - Use antibiotics with caution  - Vaccines recommended:  - flu shot yearly  - Tetanus every 10 years recommended  - Flu shot yearly recommended  - Pneumonia 13 & 23 vaccines recommended if plans for immunosuppression  - Hepatitis A & B series  - Follow up with MARIA A Goldman with Dr. Stoddard in 6 months

## 2018-04-17 NOTE — PLAN OF CARE
Pt AAOx3, VS WNL, resp e/u.  Pt refused PO fluids.  Denies any nausea or pain. Pt verbalized understanding of discharge instructions. Ambulated to exit with steady gait, accompanied by spouse. OLEG.

## 2018-04-17 NOTE — PROVATION PATIENT INSTRUCTIONS
Discharge Summary/Instructions after an Endoscopic Procedure  Patient Name: Cara Manriquez  Patient MRN: [id number]  Patient YOB: 1980  Tuesday, April 17, 2018  Marla Stoddard MD  RESTRICTIONS:  During your procedure today, you received medications for sedation.  These   medications may affect your judgment, balance and coordination.  Therefore,   for 24 hours, you have the following restrictions:   - DO NOT drive a car, operate machinery, make legal/financial decisions,   sign important papers or drink alcohol.    ACTIVITY:  The following day: return to full activity including work, except no heavy   lifting, straining or running for 3 days if polyps were removed.  DIET:  Eat and drink normally unless instructed otherwise.     TREATMENT FOR COMMON SIDE EFFECTS:  - Mild abdominal pain, nausea, belching, bloating or excessive gas:  rest,   eat lightly and use a heating pad.  - Sore Throat: treat with throat lozenges and/or gargle with warm salt   water.  - Because air was used during the procedure, expelling large amounts of air   from your rectum or belching is normal.  - If a bowel prep was taken, you may not have a bowel movement for 1-3 days.    This is normal.  SYMPTOMS TO WATCH FOR AND REPORT TO YOUR PHYSICIAN:  1. Abdominal pain or bloating, other than gas cramps.  2. Chest pain.  3. Back pain.  4. Signs of infection such as: chills or fever occurring within 24 hours   after the procedure.  5. Rectal bleeding, which would show as bright red, maroon, or black stools.   (A tablespoon of blood from the rectum is not serious, especially if   hemorrhoids are present.)  6. Vomiting.  7. Weakness or dizziness.  GO DIRECTLY TO THE NEAREST EMERGENCY ROOM IF YOU HAVE ANY OF THE FOLLOWING:      Difficulty breathing              Chills and/or fever over 101 F   Persistent vomiting and/or vomiting blood   Severe abdominal pain   Severe chest pain   Black, tarry stools   Bleeding- more than one tablespoon   Any  other symptom or condition that you feel may need urgent attention  Your doctor recommends these additional instructions:  If any biopsies were taken, your doctors clinic will contact you in 1 to 2   weeks with any results.  - Discharge patient to home.   - Patient has a contact number available for emergencies.  The signs and   symptoms of potential delayed complications were discussed with the   patient.  Return to normal activities tomorrow.  Written discharge   instructions were provided to the patient.   - Resume previous diet.   - Await pathology results.   - Return to GI clinic today as scheduled this afternoon.   For questions, problems or results please call your physician - Marla Stoddard MD at Work:  [phone].  [facility], EMERGENCY ROOM PHONE NUMBER: [phone]  IF A COMPLICATION OR EMERGENCY SITUATION ARISES AND YOU ARE UNABLE TO REACH   YOUR PHYSICIAN - GO DIRECTLY TO THE EMERGENCY ROOM.  Marla Stoddard MD  4/17/2018 10:17:48 AM  This report has been verified and signed electronically.

## 2018-04-17 NOTE — TELEPHONE ENCOUNTER
Called pharmacy and gave verbal order for:    traMADol (ULTRAM) 50 mg tablet  Take 0.5 tablets (25 mg total) by mouth every 8 hours as needed for Pain. Take 25-50 mg daily every 6 hours prn pain.    Dispense 30 tablets with no refills.    Spoke to Wei, pharmacist who verbalized understanding.

## 2018-04-17 NOTE — H&P
Short Stay Endoscopy History and Physical    PCP - Primary Doctor No  Referring Physician - Marla Stoddard MD  [address] [place] [address]    Procedure - pouchoscopy  ASA - per anesthesia  Mallampati - per anesthesia  History of Anesthesia problems - no  Family history Anesthesia problems -  no   Plan of anesthesia - General    HPI  38 y.o. female  Reason for procedure: diarrhea with history of pouchitis       ROS:  Constitutional: No fevers, chills, No weight loss  CV: No chest pain  Pulm: No cough, No shortness of breath  GI: see HPI    Medical History:  has a past medical history of Ulcerative colitis.    Surgical History:  has a past surgical history that includes Colonoscopy;  x 2; Tubal ligation; Colon surgery; and Colon surgery (9/10/13).    Family History: family history is not on file.. Otherwise no colon cancer, inflammatory bowel disease, or GI malignancies.    Social History:  reports that she has never smoked. She has never used smokeless tobacco. She reports that she does not drink alcohol or use drugs.    Review of patient's allergies indicates:  No Known Allergies    Medications:   No prescriptions prior to admission.       Physical Exam:    Vital Signs:   Vitals:    18 0946   BP: 128/77   Pulse: 93   Resp: 14   Temp: 98.2 °F (36.8 °C)       General Appearance: Well appearing in no acute distress  Lungs: CTA anteriorly  Heart:  Regular rate, S1, S2 normal, no murmurs heard.  Abdomen: Soft, non tender, non distended with normal bowel sounds, no masses  Extremities: No edema    Labs:  Lab Results   Component Value Date    WBC 5.95 10/30/2017    HGB 12.1 10/30/2017    HCT 36.8 (L) 10/30/2017     (H) 10/30/2017    ALT 12 10/30/2017    AST 15 10/30/2017     10/30/2017    K 3.8 10/30/2017     10/30/2017    CREATININE 0.8 10/30/2017    BUN 9 10/30/2017    CO2 25 10/30/2017    TSH 3.551 10/30/2017       I have explained the risks and benefits of this  endoscopic procedure to the patient including but not limited to bleeding, inflammation, infection, perforation, and death.      Marla Stoddard MD

## 2018-04-17 NOTE — ANESTHESIA PREPROCEDURE EVALUATION
04/17/2018  Cara Manriquez is a 38 y.o., female.    Anesthesia Evaluation         Review of Systems  Anesthesia Hx:  No problems with previous Anesthesia   Social:  Non-Smoker    Cardiovascular:   Exercise tolerance: good Denies Hypertension.  Denies CAD.     Denies Angina.  Functional Capacity Can you climb two flights of stairs? ==> Yes    Pulmonary:   Denies Asthma.  Denies Recent URI.  Denies Sleep Apnea.    Renal/:  Renal/ Normal     Hepatic/GI:   Denies PUD. Denies Hiatal Hernia.  Denies GERD. Denies Liver Disease.  Denies Hepatitis.  Bowel Conditions:  Inflammatory Bowel Disease    Neurological:   Denies CVA. Denies Seizures.    Endocrine:   Denies Diabetes. Denies Hypothyroidism.        Physical Exam  General:  Well nourished    Airway/Jaw/Neck:  Airway Findings: Mouth Opening: Normal Tongue: Normal  General Airway Assessment: Adult  Mallampati: I  TM Distance: Normal, at least 6 cm  Jaw/Neck Findings:  Neck ROM: Normal ROM  Neck Findings:     Eyes/Ears/Nose:  EYES/EARS/NOSE FINDINGS: Normal   Dental:  Dental Findings: In tact   Chest/Lungs:  Chest/Lungs Findings: Clear to auscultation     Heart/Vascular:  Heart Findings: Rate: Normal  Rhythm: Regular Rhythm  Sounds: Normal        Mental Status:  Mental Status Findings:  Alert and Oriented         Anesthesia Plan  Type of Anesthesia, risks & benefits discussed:  Anesthesia Type:  general  Patient's Preference: Proceed with anesthesia understanding that the risks are very small but could be serious or life threatening.  Intra-op Monitoring Plan: standard ASA monitors  Intra-op Monitoring Plan Comments:   Post Op Pain Control Plan:   Post Op Pain Control Plan Comments:   Induction:   IV  Beta Blocker:  Patient is not currently on a Beta-Blocker (No further documentation required).       Informed Consent: Patient understands risks and agrees with  Anesthesia plan.  Questions answered. Anesthesia consent signed with patient.  ASA Score: 2     Day of Surgery Review of History & Physical: I have interviewed and examined the patient. I have reviewed the patient's H&P dated:            Ready For Surgery From Anesthesia Perspective.

## 2018-04-17 NOTE — PROGRESS NOTES
[facility]             Inflammatory Bowel Disease Follow-up  Note              TODAY'S VISIT DATE:  4/17/2018    Chief Complaint:   Chief Complaint   Patient presents with    Pouchitis      PCP: Primary Doctor No    Previous History:  Cara Manriquez is a 38 y.o. female with Inflammatory Bowel Disease initially ulcerative colitis with possible Crohn's disease of the J pouch s/p a 2 step restorative proctocolectomy with IPAA (7/9/13, 9/10/2013) due to medically refractory disease.  She has a past medical history of possible new onset diagnosis of thyroid disease (per patient history-TSH slightly elevated, TPO high).  She initially developed symptoms of bloody diarrhea, abdominal pain, and weight loss in 1991 and was diagnosed with ulcerative colitis by colonoscopy.  She had several flares of her UC with recurrent use of prednisone which was generally helpful but by [id number] this was not effective and she had continued symptoms.  She saw Dr. Sheppard in 5/2013 at which time she was on sulfasalazine and had previously failed asacol, colazal, azathioprine, remicade, rowasa enemas, canasa suppositories, cortenemas, MTX, flagyl and entocort. Colonoscopy done in 5/2013 by Dr. Sheppard showed severe diffuse pancolitis with normal TI and no evidence of CMV.  Due to medically refractory disease and severity she saw Dr. Barcenas in 2013 underwent a 2 step restorative proctocolectomy with IPAA on 7/9/2013 with pathology consistent with severe inflammation consistent with severe ulcerative colitis and normal ileum and appendix with fibrous obliteration.  Post-op was complicated by a presacral abscess 3 weeks after surgery that required IR drainage.  She had a pouchogram that showed no anastomotic leak so she proceeded with an ileostomy takedown on 9/10/2013.  She had some rectal pressure/discomfort, perianal itching at her post-op visit that was treated with calmoseptine cream and miralax/enema for  "constipation, and fiber-con and by 12/2013 she was doing well.  Soon after surgery she began with "pouchitis" symptoms of frequent diarrhea, urgency, nocturnal bowel movements, anal leakage, and rectal pressure that was treated with flagyl.  She has approximately 3 additional recurrent episodes of "pouchitis" since surgery that was treated with and resolved with ABX with her most recent episode in 9/2017 that was treated with cipro/flagyl though patient only took cipro.  She also had a perianal skin tag that was excised in 9/4/2015.  MRI pelvis on 10/11/17 showed mild increase in the anteroposterior width of the levator hiatus on rest without significant change on straining with mild descent of the levator plate on straining.  Her most recent pouchoscopy on 10/12/17 showed inflammation in the ileoanal pouch and abdifatah-terminal ileum with biopsies consistent with mild active chronic ileitis.  She remained on no medications for her IBD.       Interval History:  - current IBD meds: none  15 watery-formed BMs with no blood and 4 nocturnal BMs, with urgency, feeling like cannot empty pouch fully  - 4/17/2018 pouchoscopy: Very mild pouchitis involving the J pouch body with normal rectal cuff, neoterminal ileum and blind end (path pending)  - rectal pain/pressure 5/10, worse at night due to urgency  - constitutional/GI symptoms: no fevers/chills, weight loss, dysphagia, GERD, abdominal pain  - extraintestinal manifestations: no eye pain/redness, skin lesions/rashes, liver problems, joint pain/swelling/stiffness, dysuria/hematuria    Prior Pertinent Surgeries:   7/9/2013: laparoscopic ileal pouch anal anastomosis (path: severe active UC of colon segments, involving the distal margin of the ileum colon segment, both proximal and distal margins of the second segment of colon, the ileum margin is free of active inflammation, appendix with fibrous obliteration  7/29/2013: CT guided abscess drain with 8Fr drain placed  9/10/2013: " ileostomy closure (path: small bowel with reactive change)  2015: excision of perianal skin tag (path: fibroepithelial polyp)    Pertinent Endoscopy/Imagin2013 Colonoscopy: diffuse severe inflammation from the rectum to transverse colon; normal ileum (path-ascending & transverse: normal) (path-descending: chronic active colitis)   2013 CT abd/pelvis: presacral abscess; postsurgical changes c/w total colectomy; J-pouch formation, and RLQ ileostomy  2013 Pouchogram: no anastomotic leak  2015 pouchoscopy: healthy appearing with good health mucosa, no lesions, no inflammation  10/11/2017 MRI Pelvis: mild increase in the anteroposterior width of the levator hiatus on rest without significant change on straining with mild descent of the levator plate on straining  10/12/2017 pouchoscopy: inflammation in the ileoanal pouch and abdifatah-terminal ileum secondary to pouchitis (path-ileum, afferent limb: mild active chronic ileitis) (path-ileum, proximal neoterminal: mild active ileitis with expansion of the lamina propria) (path-pouch: mild active enteritis with expansion of the lamina propria)  2018 pouchoscopy: Very mild pouchitis involving the J pouch body with normal rectal cuff, neoterminal ileum and blind end (path pending)    Pertinent Labs:  Lab Results   Component Value Date    SEDRATE 40 (H) 10/30/2017    CRP 10.7 (H) 10/30/2017     Lab Results   Component Value Date    TTGIGA 4 10/30/2017     10/30/2017     Lab Results   Component Value Date    TSH 3.551 10/30/2017    FREET4 0.88 10/30/2017     Lab Results   Component Value Date    DUZJBPMK13UE 33 10/30/2017    GLPXTFCX68 774 10/30/2017     Lab Results   Component Value Date    HEPBSAG Negative 10/30/2017    HEPBCAB Negative 10/30/2017    HEPCAB Negative 10/30/2017     Lab Results   Component Value Date    NCQ83ZISA Negative 10/30/2017     No results found for: NIL, TBAG, TBAGNIL, MITOGENNIL, TBGOLD, TSPOTSCREN  No results found  for: TPTMINTERP, TPMTRESULT  Lab Results   Component Value Date    CDIFFICILEAN Negative 05/01/2013    CDIFFTOX Negative 05/01/2013     No results found for: CALPROTECTIN    Therapeutic Drug Monitoring Labs:  No results found for: PROMETH  No results found for: ANSADAINIT, INFLIXIMAB, INFLIXINTERP    Prior IBD Meds before surgery:  Prednisone (effective, lost response over the years)  Entocort (possibly effective)  MTX (unsure of oral or SC)  Cortenemas (ineffective)  Canasa (ineffective)  Flagyl (ineffective)  Rowasa  colazal (ineffective)  Imuran 200 mg (ineffective)  Remicade (2003--ineffective)  Apriso 800 mg TID  Sulfasalazine 3 gm/day    Prior IBD Meds after surgery- J pouch:  Flagyl- ?abdominal pain  cipro- effective  Natural Creation Probiotic(Acidophilis, Bifidum, Bulgaricus, Rhamnosus, THermophilus, Infantis, Longum, Brevis, Casei, Salicarius, Helveticus, Boulardii, Plantarum) 1 tablet daily--ineffective  Imodium BID--unsure of ineffectiveness    Current IBD Meds:  None    Vaccinations:  Influenza (inactive): recommended  Pneumococcal PCV 13: will recommend if plans for immunosuppression  Pneumococcal PCV 23: will recommend if plans for immunosuppression  Tetanus (TdaP): recommended  HPV (males and females ages 19-27 yo): N/A  Meningococcal (risk factors- complement component deficiency, spleen damage or splenectomy, HIV, traveling to endemic areas, college student residing in residence [place],  recruits): no risk factors  Hepatitis B: recommended  Lab Results   Component Value Date    HEPBSAB Negative 10/30/2017   Hepatitis A (risk factors- traveling to high endemic areas, chronic liver disease, clotting factor disorders, MSM, illicit drug users): recommended  Lab Results   Component Value Date    HEPAIGG Negative 10/30/2017   MMR (live vaccine):        Chickenpox status/Varicella (live vaccine): Immune  Lab Results   Component Value Date    VARICELLAZOS 2.52 (H) 10/30/2017    VARICELLAINT Positive  (A) 10/30/2017   Zoster (age >49 yo, live vaccine): N/A    NSAID use/indication:  Yes-motrin/ibuprofen once a month if needed    Narcotic use:  No    Alternative/Complementary Meds for IBD:  No    Review of Systems   Constitutional: Negative for chills, fever and weight loss.   HENT:        No oral ulcers, dysphagia, oral thrush   Eyes: Negative for blurred vision, pain and redness.   Respiratory: Negative for cough and shortness of breath.    Cardiovascular: Negative for chest pain.   Gastrointestinal: Negative for abdominal pain, heartburn, nausea and vomiting.        Rectal pain/pressure   Genitourinary: Negative for dysuria and hematuria.   Musculoskeletal: Negative for back pain and joint pain.   Skin: Negative for rash.   Psychiatric/Behavioral: Negative for depression. The patient is not nervous/anxious and does not have insomnia.      All Medical History/Surgical History/Family History/Social History/Allergies have been reviewed and updated in EMR    Review of patient's allergies indicates:  No Known Allergies    No outpatient prescriptions have been marked as taking for the 4/17/18 encounter (Office Visit) with RAKESH Estes.     Vital Signs:  Vitals:    04/17/18 1336   BP: 125/79   Pulse: 75   Resp: 16   Temp: 98.6 °F (37 °C)   Weight: 87.9 kg (193 lb 12.6 oz)   Height: 5' (1.524 m)   PainSc:   5   PainLoc: Rectum     Physical Exam   Constitutional: She is oriented to person, place, and time. She appears well-developed.   HENT:   Mouth/Throat: Oropharynx is clear and moist. No oral lesions.   No oral ulcers or thrush   Eyes: Conjunctivae are normal. Pupils are equal, round, and reactive to light.   Cardiovascular: Normal rate and regular rhythm.    Pulmonary/Chest: Effort normal and breath sounds normal.   Abdominal: Soft. Bowel sounds are normal. There is tenderness in the right lower quadrant. There is no rebound and no guarding.   Soft abdomen with mild RLQ tenderness, no guarding or rebound; normal  bowel sounds   Musculoskeletal:        Right lower leg: She exhibits no swelling.        Left lower leg: She exhibits no swelling.   Neurological: She is alert and oriented to person, place, and time.   Skin: No rash noted.   Psychiatric: She has a normal mood and affect.   Nursing note and vitals reviewed.    Labs:   Lab Results   Component Value Date    WBC 5.95 10/30/2017    HGB 12.1 10/30/2017    HCT 36.8 (L) 10/30/2017    MCV 79 (L) 10/30/2017     (H) 10/30/2017     Lab Results   Component Value Date    CREATININE 0.8 10/30/2017    ALBUMIN 3.6 10/30/2017    BILITOT 0.2 10/30/2017    ALKPHOS 113 10/30/2017    AST 15 10/30/2017    ALT 12 10/30/2017     No results found for: NIL, TBAG, TBAGNIL, MITOGENNIL, TBGOLD, TSPOTSCREN    Assessment/Plan:  Cara Manriquez is a 38 y.o. female with diarrhea in setting of 2 step restorative proctocolectomy with IPAA (7/9/13, 9/10/2013) due to medically refractory disease.  We initially saw her in 10/2017 and due to feeling better on cipro/flagyl prescribed by Dr. Barcenas we decided not to intervene and she was to let us know if she develops symptoms again in which case we would plan on doing an urgent pouchoscopy and f/u. Unfortunately she was unable to make the trip to see us and get pouchoscopy done due to finances.  We proceeded with pouchoscopy today, 4/17/2018 showed very mild pouchitis involving the J pouch with no signs of Crohn's disease or infections.  Her symptoms seem to be out of proportion to the amount of diarrhea she is having.  We will get stool studies to be complete and make sure she has no signs of infection. In addition if these are negative we will start her on cipro/flagyl.  If these are negative she may have component of SIBO or IPS.  We will see if she responds to cipro/flagyl and she is to give us update 2 weeks later. After that if she responds we will consider prebiotics vs antidiarrheals to see if this can help her longer term. Patient reported  that she needs something for pain and Dr. Barcenas had given tramadol prior to this. Dr. Stoddard said that this can slow down the GI tract and cause more     # Diarrhea  - stool culture  - stool C. Diff  - CMP today    # Pouchitis vs SIBO vs Irritable Pouch Syndrome---history of 2 step restorative proctocolectomy with IPAA (7/9/13, 9/10/2013):    - once stool studies negative, will treat with Cipro/Flagyl for 14 days  - after cipro/flagy treatment, will consider prebiotic lactulose and Imodium  - rectal pain/pressure--tramadol RX to be call in from Dr. Stoddard - only given limited amount and pt aware  - Basic labs: CMP/CRP    # Possible hypothyroidism:   - per patient history-no records-TSH slightly elevated, TPO high  - 10/30/2017: TSH 3.551, free T4 0.88    # IBD specific health maintenance:  Colorectal cancer risk:    Risks factors: long standing hx of UC prior to J pouch  - colonoscopy:  total colectomy, routine surveillance of rectal cuff    Pap smear recommended every 3 years with pelvic exam yearly - next due now  Opthamologic exam recommended yearly - next due now  Dermatologic exam recommended yearly - next due now    Bone health:  Risk of osteopenia/osteoporosis:  Risks factors: none  Vitamin D:   Lab Results   Component Value Date    SDOIBFMZ95IQ 33 10/30/2017     Vaccine counseling:  - flu shot yearly  - Tetanus every 10 years recommended  - Flu shot yearly recommended  - Pneumonia 13&23 vaccines recommended if plans for immunosuppression  - Hepatitis A & B series    Follow up: with MARIA A Goldman in 6 months with Dr. Stoddard in clinic    Total visit time was 40 minutes, more than 50% of which was spent in face-to-face counseling with patient regarding evaluation and management goals and treatment options for pouchitis    Susi Cantrell, YVESP-C  Department of Gastroenterology  Inflammatory Bowel Disease Program    I have personally performed a face to face diagnostic evaluation on this patient and helped develop a treatment  plan with NP. I have reviewed and agree with today's findings and care plan outlined by Susi Cantrell NP.     Marla Stoddard MD   Department of Gastroenterology  Medical Director, Inflammatory Bowel Disease

## 2018-04-18 ENCOUNTER — PATIENT MESSAGE (OUTPATIENT)
Dept: GASTROENTEROLOGY | Facility: CLINIC | Age: 38
End: 2018-04-18

## 2018-04-18 DIAGNOSIS — K91.850 POUCHITIS: Primary | ICD-10-CM

## 2018-04-18 LAB
E COLI SXT1 STL QL IA: NEGATIVE
E COLI SXT2 STL QL IA: NEGATIVE

## 2018-04-18 RX ORDER — METRONIDAZOLE 500 MG/1
500 TABLET ORAL 3 TIMES DAILY
Qty: 42 TABLET | Refills: 0 | Status: SHIPPED | OUTPATIENT
Start: 2018-04-18 | End: 2018-05-15

## 2018-04-18 RX ORDER — CIPROFLOXACIN 500 MG/1
500 TABLET ORAL 2 TIMES DAILY
Qty: 28 TABLET | Refills: 0 | Status: SHIPPED | OUTPATIENT
Start: 2018-04-18 | End: 2018-05-22 | Stop reason: SDUPTHER

## 2018-04-18 NOTE — TELEPHONE ENCOUNTER
Spoke to patient regarding possible allergy to flagyl listed in her chart.  She states that she is unsure of an allergy to flagyl but had some abdominal pain previously when taking the flagyl after surgery but is unsure if the flagyl caused the abdominal pain or if the pain was from surgery.  She is willing to try the flagyl and will call us if she has any problems.  Has never had any numbness, tingling, CP, SOB, wheezing, rash, nausea, or vomiting with flagyl.  Instructed to call us immediately with any SE and discussed SE with patient.  Patient verbalizes understanding and agrees with the treatment plan.  Questions answered.    KG

## 2018-04-20 LAB — BACTERIA STL CULT: NORMAL

## 2018-04-24 ENCOUNTER — TELEPHONE (OUTPATIENT)
Dept: ENDOSCOPY | Facility: HOSPITAL | Age: 38
End: 2018-04-24

## 2018-05-15 ENCOUNTER — TELEPHONE (OUTPATIENT)
Dept: GASTROENTEROLOGY | Facility: CLINIC | Age: 38
End: 2018-05-15

## 2018-05-15 DIAGNOSIS — K91.850 POUCHITIS: Primary | ICD-10-CM

## 2018-05-15 RX ORDER — LACTULOSE 10 G/15ML
SOLUTION ORAL
Qty: 900 ML | Refills: 1 | Status: ON HOLD | OUTPATIENT
Start: 2018-05-15 | End: 2023-08-14 | Stop reason: HOSPADM

## 2018-05-15 NOTE — TELEPHONE ENCOUNTER
Called and spoke with pt  She stated she wanted to update us.  She had to stop the Flagyl due to tingling in her Left legs, hands, and knees  She completed the Cipro   She is having 4 - 6 soft - formed Bm's in a 24 hour period. (sometimes not that many.  Every day varies) with No blood and No nocturnal     She states she is feeling much better

## 2018-05-15 NOTE — TELEPHONE ENCOUNTER
Spoke to patient re lactulose.  Discussed starting lactulose with patient.  Will start at 10 gm once daily for 2 weeks then if tolerated will increase to twice daily.  Patient will us with an update in 2 weeks.  Patient verbalizes understanding and agrees with the treatment plan.  Questions answered.    KG

## 2018-05-15 NOTE — TELEPHONE ENCOUNTER
----- Message from Yoselin Sam sent at 5/14/2018  2:40 PM CDT -----  Contact: self - [phone]  Richi - giving update on condition and meds - please call patient at

## 2018-05-18 ENCOUNTER — TELEPHONE (OUTPATIENT)
Dept: GASTROENTEROLOGY | Facility: CLINIC | Age: 38
End: 2018-05-18

## 2018-05-18 NOTE — TELEPHONE ENCOUNTER
----- Message from Krystyna Worley sent at 5/18/2018 12:17 PM CDT -----  Needs Medical Advice    Who Called: pt   Symptoms (please be specific):  More bowel movements  How long has patient had these symptoms:  Since stopping antibiotics a couple of days ago  Pharmacy name and phone # if needed:    [facility] [address] [facility], [place] -   [address] AT [address] of [address] [facility]   [phone] (Phone)  [phone] (Fax)    Communication Preference ([employer] response to Pt. (or) Call Back # and timeframe):  [phone]  Additional Information:

## 2018-05-18 NOTE — TELEPHONE ENCOUNTER
Called and spoke with pt  She stated for the past 2 days or so she has had an increase in pressure and Urgency and she is now having 6 - 10 loose - soft BM's with 2 Nocturnal.  No blood, fever, or chills, and no pain  She is wondering if she needs more ABX or if she needs to start the Prebiotic (She has not started the Lactulose)   I asked if she took Culturelle after stopping the ABX and she was unaware about that so No     I told her I would get message sent over and we will be in touch.     Susi walked past and saw message  I called back and spoke with pt  Explained to start the Lactulose.  That is to help prevent recurring Pouchitis   I told her to take 1 daily and update us on Monday.  This should calm her gut  Expressed understanding

## 2018-05-21 ENCOUNTER — PATIENT MESSAGE (OUTPATIENT)
Dept: GASTROENTEROLOGY | Facility: CLINIC | Age: 38
End: 2018-05-21

## 2018-05-21 DIAGNOSIS — K91.850 POUCHITIS: Primary | ICD-10-CM

## 2018-05-22 RX ORDER — CIPROFLOXACIN 500 MG/1
500 TABLET ORAL 2 TIMES DAILY
Qty: 28 TABLET | Refills: 0 | Status: ON HOLD | OUTPATIENT
Start: 2018-05-22 | End: 2023-08-14 | Stop reason: HOSPADM

## 2018-05-22 NOTE — TELEPHONE ENCOUNTER
Called patient and left message.  Will give her another course of ABX cipro 500 mg BID for 14 days then instruct her to take VSL #3 probiotic OTC regular strength 2 sachets daily once she finishes the ABX.    Susi

## 2018-05-22 NOTE — TELEPHONE ENCOUNTER
Called and spoke with pt  Days improved but nights are bad  Still reporting 6 - 10 loose - soft BM's.  1 Nocturnal. (last night) No blood   Pressure and urgency at night mostly    She would like a pill Prebiotic because the liquid did not agree with her

## 2018-07-03 ENCOUNTER — PATIENT MESSAGE (OUTPATIENT)
Dept: GASTROENTEROLOGY | Facility: CLINIC | Age: 38
End: 2018-07-03

## 2020-12-09 NOTE — ANESTHESIA POSTPROCEDURE EVALUATION
Anesthesia Post Evaluation    Patient: Cara Manriquez    Procedure(s) Performed: Procedure(s) (LRB):  POUCHOSCOPY (N/A)    Final Anesthesia Type: general  Patient location during evaluation: PACU  Patient participation: Yes- Able to Participate  Level of consciousness: awake and alert  Post-procedure vital signs: reviewed and stable  Pain management: adequate  Airway patency: patent  PONV status at discharge: No PONV  Anesthetic complications: no      Cardiovascular status: blood pressure returned to baseline  Respiratory status: unassisted  Hydration status: euvolemic  Follow-up not needed.        Visit Vitals  /78   Pulse 76   Temp 36.7 °C (98.1 °F)   Resp 16   Ht 5' (1.524 m)   Wt 86.2 kg (190 lb)   LMP 04/03/2018 (Exact Date)   SpO2 100%   Breastfeeding? No   BMI 37.11 kg/m²       Pain/Maureen Score: Pain Assessment Performed: Yes (4/17/2018 10:35 AM)  Presence of Pain: denies (4/17/2018 10:35 AM)  Maureen Score: 10 (4/17/2018 10:35 AM)       With the patient's permission, left message regarding negative screening mammogram results.

## 2023-03-02 ENCOUNTER — TELEPHONE (OUTPATIENT)
Dept: GASTROENTEROLOGY | Facility: CLINIC | Age: 43
End: 2023-03-02
Payer: COMMERCIAL

## 2023-03-02 ENCOUNTER — TELEPHONE (OUTPATIENT)
Dept: HEMATOLOGY/ONCOLOGY | Facility: CLINIC | Age: 43
End: 2023-03-02
Payer: COMMERCIAL

## 2023-03-02 NOTE — TELEPHONE ENCOUNTER
----- Message from Jennifer Garcia RN sent at 2/28/2023  5:25 PM CST -----  Contact: Patient  Kelvin Wyatt,   I'm sorry. I ran out of time. Can you contact this pt? Please      ----- Message -----  From: Edison Gupta  Sent: 2/28/2023   9:25 AM CST  To: Richi Gutiérrez Staff, Swapna GOTTLIEB Staff    Type:  Patient Call          Who Called: patient         Does the patient know what this is regarding?: Requesting a call back to have a appt scheduled pt said she called on yesterday and never received a call back ;please advise            Would the patient rather a call back or a response via [facility]? Call           Best Call Back Number:[phone]             Additional Information:

## 2023-03-02 NOTE — TELEPHONE ENCOUNTER
Called pt and made an appt for her to see Dr Barcenas. JPt has a J-Pouch and has not been seen in 3 yrs. No problem, just needs to f/u with Dr Barcenas.

## 2023-03-02 NOTE — TELEPHONE ENCOUNTER
Called & spoke to pt  - States she has appt scheduled w/ Dr. Barcenas 3/30/23  - Pt will determine plan to f/u w/ IBD clinic at that time

## 2023-03-30 ENCOUNTER — OFFICE VISIT (OUTPATIENT)
Dept: SURGERY | Facility: CLINIC | Age: 43
End: 2023-03-30
Payer: COMMERCIAL

## 2023-03-30 VITALS
HEIGHT: 60 IN | RESPIRATION RATE: 19 BRPM | SYSTOLIC BLOOD PRESSURE: 106 MMHG | OXYGEN SATURATION: 100 % | HEART RATE: 71 BPM | WEIGHT: 209.31 LBS | DIASTOLIC BLOOD PRESSURE: 70 MMHG | BODY MASS INDEX: 41.09 KG/M2

## 2023-03-30 DIAGNOSIS — K91.850 POUCHITIS: Primary | ICD-10-CM

## 2023-03-30 PROCEDURE — 3008F BODY MASS INDEX DOCD: CPT | Mod: CPTII,S$GLB,, | Performed by: COLON & RECTAL SURGERY

## 2023-03-30 PROCEDURE — 1159F MED LIST DOCD IN RCRD: CPT | Mod: CPTII,S$GLB,, | Performed by: COLON & RECTAL SURGERY

## 2023-03-30 PROCEDURE — 3074F PR MOST RECENT SYSTOLIC BLOOD PRESSURE < 130 MM HG: ICD-10-PCS | Mod: CPTII,S$GLB,, | Performed by: COLON & RECTAL SURGERY

## 2023-03-30 PROCEDURE — 3074F SYST BP LT 130 MM HG: CPT | Mod: CPTII,S$GLB,, | Performed by: COLON & RECTAL SURGERY

## 2023-03-30 PROCEDURE — 3008F PR BODY MASS INDEX (BMI) DOCUMENTED: ICD-10-PCS | Mod: CPTII,S$GLB,, | Performed by: COLON & RECTAL SURGERY

## 2023-03-30 PROCEDURE — 1159F PR MEDICATION LIST DOCUMENTED IN MEDICAL RECORD: ICD-10-PCS | Mod: CPTII,S$GLB,, | Performed by: COLON & RECTAL SURGERY

## 2023-03-30 PROCEDURE — 1160F RVW MEDS BY RX/DR IN RCRD: CPT | Mod: CPTII,S$GLB,, | Performed by: COLON & RECTAL SURGERY

## 2023-03-30 PROCEDURE — 99203 PR OFFICE/OUTPT VISIT, NEW, LEVL III, 30-44 MIN: ICD-10-PCS | Mod: S$GLB,,, | Performed by: COLON & RECTAL SURGERY

## 2023-03-30 PROCEDURE — 3078F DIAST BP <80 MM HG: CPT | Mod: CPTII,S$GLB,, | Performed by: COLON & RECTAL SURGERY

## 2023-03-30 PROCEDURE — 1160F PR REVIEW ALL MEDS BY PRESCRIBER/CLIN PHARMACIST DOCUMENTED: ICD-10-PCS | Mod: CPTII,S$GLB,, | Performed by: COLON & RECTAL SURGERY

## 2023-03-30 PROCEDURE — 3078F PR MOST RECENT DIASTOLIC BLOOD PRESSURE < 80 MM HG: ICD-10-PCS | Mod: CPTII,S$GLB,, | Performed by: COLON & RECTAL SURGERY

## 2023-03-30 PROCEDURE — 99999 PR PBB SHADOW E&M-EST. PATIENT-LVL III: CPT | Mod: PBBFAC,,, | Performed by: COLON & RECTAL SURGERY

## 2023-03-30 PROCEDURE — 99999 PR PBB SHADOW E&M-EST. PATIENT-LVL III: ICD-10-PCS | Mod: PBBFAC,,, | Performed by: COLON & RECTAL SURGERY

## 2023-03-30 PROCEDURE — 99203 OFFICE O/P NEW LOW 30 MIN: CPT | Mod: S$GLB,,, | Performed by: COLON & RECTAL SURGERY

## 2023-03-30 NOTE — PROGRESS NOTES
Subjective:       Patient ID: Cara Manriquez is a 43 y.o. female.    Chief Complaint: Ileoanal pouch    HPI  Ms. Cara Manriquez is a 43 year old female presenting today for a skin tag that has been giving her issues. She usually experiences burning, throbbing, and pressure when she eats certain things.     Last colonoscopy - 13  Diffuse severe inflammation characterized by adherent blood, congestion (edema), erosions, erythema, friability, granularity, linear erosions, loss of vascularity and mucus was found from rectum to transverse colon.    Last Pouchoscopy- 18  The perianal and digital rectal examinations were normal.   The rectal cuff appeared normal. There was patchy mild inflammation, graded as Pouchitis Disease Activity Index (Endoscopic) Score 1 and characterized by few erosions and small superificial ulcerations and erythema in the pouch body. There was one small ulcer at the entry of the neoterminal ileum.  Very mild pouchitis involving the J pouch body with normal rectal cuff, neoterminal ileum and blind end.    Neg family hx of CRC or IBD.      Review of patient's allergies indicates:   Allergen Reactions    Flagyl [metronidazole] Other (See Comments)     Numbness, tingling--leg, hands, knees       Past Medical History:   Diagnosis Date    Ulcerative colitis        Past Surgical History:   Procedure Laterality Date     x 2      COLON SURGERY      lap assist proctolectomy, j-pouch, diverting ileostomy    COLON SURGERY  9/10/13    ileostomy takedown    COLONOSCOPY      05    TUBAL LIGATION         Current Outpatient Medications   Medication Sig Dispense Refill    ciprofloxacin HCl (CIPRO) 500 MG tablet Take 1 tablet (500 mg total) by mouth 2 (two) times daily. 28 tablet 0    lactulose (CHRONULAC) 20 gram/30 mL Soln Start 15 mLs (10 gm total) by mouth daily for 2 weeks then if tolerated increase to 15 mLs twice daily 900 mL 1    traMADol (ULTRAM) 50 mg tablet Take 0.5 tablets (25 mg total)  by mouth every 8 (eight) hours as needed for Pain. Take 25 to 50 mg daily every 6 hours prn pain 30 tablet 0     No current facility-administered medications for this visit.       Family History   Problem Relation Age of Onset    Ulcerative colitis Neg Hx     Crohn's disease Neg Hx     Colon cancer Neg Hx     Celiac disease Neg Hx     Cirrhosis Neg Hx     Colon polyps Neg Hx     Cystic fibrosis Neg Hx     Esophageal cancer Neg Hx     Hemochromatosis Neg Hx     Inflammatory bowel disease Neg Hx     Irritable bowel syndrome Neg Hx     Liver cancer Neg Hx     Liver disease Neg Hx     Rectal cancer Neg Hx     Stomach cancer Neg Hx     Leno's disease Neg Hx     Lymphoma Neg Hx     Tuberculosis Neg Hx     Scleroderma Neg Hx     Rheum arthritis Neg Hx     Multiple sclerosis Neg Hx     Melanoma Neg Hx     Lupus Neg Hx     Psoriasis Neg Hx     Skin cancer Neg Hx        Social History     Socioeconomic History    Marital status:    Tobacco Use    Smoking status: Never    Smokeless tobacco: Never   Substance and Sexual Activity    Alcohol use: No     Alcohol/week: 0.0 standard drinks    Drug use: No       Review of Systems   Constitutional: Negative.    Respiratory: Negative.     Cardiovascular: Negative.    Musculoskeletal: Negative.      Objective:      Physical Exam    No Exam today - deferred to sedated exam.     Lab Results   Component Value Date    WBC 5.95 10/30/2017    HGB 12.1 10/30/2017    HCT 36.8 (L) 10/30/2017    MCV 79 (L) 10/30/2017     (H) 10/30/2017     BMP  Lab Results   Component Value Date     04/17/2018    K 4.2 04/17/2018     04/17/2018    CO2 27 04/17/2018    BUN 12 04/17/2018    CREATININE 0.9 04/17/2018    CALCIUM 9.4 04/17/2018    ANIONGAP 8 04/17/2018    ESTGFRAFRICA >60.0 04/17/2018    EGFRNONAA >60.0 04/17/2018     CMP  Sodium   Date Value Ref Range Status   04/17/2018 137 136 - 145 mmol/L Final     Potassium   Date Value Ref Range Status   04/17/2018 4.2 3.5 - 5.1  mmol/L Final     Chloride   Date Value Ref Range Status   04/17/2018 102 95 - 110 mmol/L Final     CO2   Date Value Ref Range Status   04/17/2018 27 23 - 29 mmol/L Final     Glucose   Date Value Ref Range Status   04/17/2018 93 70 - 110 mg/dL Final     BUN   Date Value Ref Range Status   04/17/2018 12 6 - 20 mg/dL Final     Creatinine   Date Value Ref Range Status   04/17/2018 0.9 0.5 - 1.4 mg/dL Final     Calcium   Date Value Ref Range Status   04/17/2018 9.4 8.7 - 10.5 mg/dL Final     Total Protein   Date Value Ref Range Status   04/17/2018 7.7 6.0 - 8.4 g/dL Final     Albumin   Date Value Ref Range Status   04/17/2018 3.7 3.5 - 5.2 g/dL Final     Total Bilirubin   Date Value Ref Range Status   04/17/2018 0.4 0.1 - 1.0 mg/dL Final     Comment:     For infants and newborns, interpretation of results should be based  on gestational age, weight and in agreement with clinical  observations.  Premature Infant recommended reference ranges:  Up to 24 hours.............<8.0 mg/dL  Up to 48 hours............<12.0 mg/dL  3-5 days..................<15.0 mg/dL  6-29 days.................<15.0 mg/dL       Alkaline Phosphatase   Date Value Ref Range Status   04/17/2018 92 55 - 135 U/L Final     AST   Date Value Ref Range Status   04/17/2018 18 10 - 40 U/L Final     ALT   Date Value Ref Range Status   04/17/2018 18 10 - 44 U/L Final     Anion Gap   Date Value Ref Range Status   04/17/2018 8 8 - 16 mmol/L Final     eGFR if    Date Value Ref Range Status   04/17/2018 >60.0 >60 mL/min/1.73 m^2 Final     eGFR if non    Date Value Ref Range Status   04/17/2018 >60.0 >60 mL/min/1.73 m^2 Final     Comment:     Calculation used to obtain the estimated glomerular filtration  rate (eGFR) is the CKD-EPI equation.                Assessment:       1. Pouchitis        Plan:       Schedule scope under sedation

## 2023-04-24 ENCOUNTER — TELEPHONE (OUTPATIENT)
Dept: ENDOSCOPY | Facility: HOSPITAL | Age: 43
End: 2023-04-24
Payer: COMMERCIAL

## 2023-04-24 NOTE — TELEPHONE ENCOUNTER
"----- Message from Sánchez Barcenas MD sent at 3/30/2023  1:04 PM CDT -----  Regarding: pouchoscopy      Procedure: pouchoscopy    Diagnosis: surviellance pouchoscopy    Procedure Timin-12 weeks    #If within 4 weeks selected, please marty as high priority#    #If greater than 12 weeks, please select "4-12 weeks" and delay sending until 2 months prior to requested date#     Provider: Myself    Location: [address]    Additional Scheduling Information: No scheduling concerns    Prep Specifications:clear liquids day prior/ 1 enema morning of procedures.    Have you attached a patient to this message: yes     "

## 2023-07-10 ENCOUNTER — TELEPHONE (OUTPATIENT)
Dept: SURGERY | Facility: CLINIC | Age: 43
End: 2023-07-10
Payer: COMMERCIAL

## 2023-07-10 NOTE — TELEPHONE ENCOUNTER
----- Message from Taina Minaya sent at 7/10/2023  4:31 PM CDT -----  Regarding: appt  Contact: @ [phone]  Pt requesting a appointment for the following  Hemorrhoid follow up ...Please call and adv @ [phone]

## 2023-07-10 NOTE — TELEPHONE ENCOUNTER
Spoke with patient. States that she needs to have a flex sig for her pouch, order and message sent to endoscopy.

## 2023-07-11 ENCOUNTER — NURSE TRIAGE (OUTPATIENT)
Dept: ADMINISTRATIVE | Facility: CLINIC | Age: 43
End: 2023-07-11
Payer: COMMERCIAL

## 2023-07-11 ENCOUNTER — TELEPHONE (OUTPATIENT)
Dept: ENDOSCOPY | Facility: HOSPITAL | Age: 43
End: 2023-07-11
Payer: COMMERCIAL

## 2023-07-11 VITALS — WEIGHT: 205 LBS | HEIGHT: 60 IN | BODY MASS INDEX: 40.25 KG/M2

## 2023-07-11 DIAGNOSIS — K91.850 POUCHITIS: Primary | ICD-10-CM

## 2023-07-11 NOTE — TELEPHONE ENCOUNTER
Pt has a J pouch and is scheduled for a scope on 8/14. Pt stated she wasn't sure when she needed to take her 2 enemas. Informed pt per instructions that were email to her she will take the 2 enemas on day of procedure once she is checked in and admitted she will be directed to a private bathroom to insert the enemas. Pt verbalized understanding.   Reason for Disposition   Question about upcoming scheduled test, no triage required and triager able to answer question    Protocols used: Information Only Call-A-AH

## 2023-07-11 NOTE — TELEPHONE ENCOUNTER
Spoke to Cara to schedule procedure(s) Flexible Sigmoidoscopy (Flex Sig)       Physician to perform procedure(s) Dr. TED Barcenas  Date of Procedure (s) 8/14/23  Arrival Time 10:25 AM  Time of Procedure(s) 11:23 AM   Location of Procedure(s) [address]  Type of Rx Prep sent to patient: Enema  Instructions provided to patient via [facility]/email    Patient was informed on the following information and verbalized understanding. Screening questionnaire reviewed with patient and complete. If procedure requires anesthesia, a responsible adult needs to be present to accompany the patient home, patient cannot drive after receiving anesthesia. Appointment details are tentative, especially check-in time. Patient will receive a prep-op call 4 days prior to confirm check-in time for procedure. If applicable the patient should contact their pharmacy to verify Rx for procedure prep is ready for pick-up. Patient was advised to call the scheduling department at [phone] if pharmacy states no Rx is available. Patient was advised to call the endoscopy scheduling department if any questions or concerns arise.      [facility] Scheduling Department

## 2023-07-11 NOTE — TELEPHONE ENCOUNTER
"----- Message from Heather Reyes RN sent at 7/10/2023  5:27 PM CDT -----  Procedure: Flex Sig for J Pouch    Diagnosis: Pouchitis    Procedure Timin-12 weeks    #If within 4 weeks selected, please marty as high priority#    #If greater than 12 weeks, please select "5-12 weeks" and delay sending until 2 months prior to requested date#     Provider: Swapna     Location: [address]    Additional Scheduling Information: No scheduling concerns    Prep Specifications:Clear Liquids the day before and enemas    Have you attached a patient to this message: yes     "

## 2023-07-14 ENCOUNTER — PATIENT MESSAGE (OUTPATIENT)
Dept: ENDOSCOPY | Facility: HOSPITAL | Age: 43
End: 2023-07-14
Payer: COMMERCIAL

## 2023-07-17 ENCOUNTER — TELEPHONE (OUTPATIENT)
Dept: ENDOSCOPY | Facility: HOSPITAL | Age: 43
End: 2023-07-17
Payer: COMMERCIAL

## 2023-08-14 ENCOUNTER — HOSPITAL ENCOUNTER (OUTPATIENT)
Facility: HOSPITAL | Age: 43
Discharge: HOME | End: 2023-08-14
Attending: COLON & RECTAL SURGERY | Admitting: COLON & RECTAL SURGERY
Payer: COMMERCIAL

## 2023-08-14 ENCOUNTER — ANESTHESIA (OUTPATIENT)
Dept: ENDOSCOPY | Facility: HOSPITAL | Age: 43
End: 2023-08-14
Payer: COMMERCIAL

## 2023-08-14 ENCOUNTER — ANESTHESIA EVENT (OUTPATIENT)
Dept: ENDOSCOPY | Facility: HOSPITAL | Age: 43
End: 2023-08-14
Payer: COMMERCIAL

## 2023-08-14 VITALS
BODY MASS INDEX: 40.05 KG/M2 | DIASTOLIC BLOOD PRESSURE: 77 MMHG | SYSTOLIC BLOOD PRESSURE: 124 MMHG | RESPIRATION RATE: 16 BRPM | HEART RATE: 62 BPM | TEMPERATURE: 98 F | OXYGEN SATURATION: 98 % | HEIGHT: 60 IN | WEIGHT: 204 LBS

## 2023-08-14 DIAGNOSIS — K91.850 ILEAL POUCHITIS: ICD-10-CM

## 2023-08-14 DIAGNOSIS — Z87.19 HISTORY OF ULCERATIVE COLITIS: Primary | ICD-10-CM

## 2023-08-14 DIAGNOSIS — K91.850 POUCHITIS: ICD-10-CM

## 2023-08-14 LAB
B-HCG UR QL: NEGATIVE
CTP QC/QA: YES

## 2023-08-14 PROCEDURE — 25000003 PHARM REV CODE 250: Performed by: COLON & RECTAL SURGERY

## 2023-08-14 PROCEDURE — E9220 PRA ENDO ANESTHESIA: HCPCS | Mod: ,,, | Performed by: STUDENT IN AN ORGANIZED HEALTH CARE EDUCATION/TRAINING PROGRAM

## 2023-08-14 PROCEDURE — E9220 PRA ENDO ANESTHESIA: ICD-10-PCS | Mod: ,,, | Performed by: STUDENT IN AN ORGANIZED HEALTH CARE EDUCATION/TRAINING PROGRAM

## 2023-08-14 PROCEDURE — 27201012 HC FORCEPS, HOT/COLD, DISP: Performed by: COLON & RECTAL SURGERY

## 2023-08-14 PROCEDURE — 37000008 HC ANESTHESIA 1ST 15 MINUTES: Performed by: COLON & RECTAL SURGERY

## 2023-08-14 PROCEDURE — 88305 TISSUE EXAM BY PATHOLOGIST: ICD-10-PCS | Mod: 26,,, | Performed by: STUDENT IN AN ORGANIZED HEALTH CARE EDUCATION/TRAINING PROGRAM

## 2023-08-14 PROCEDURE — 44386 PR ENDOSCOPY, BOWEL POUCH, BIOPSY: ICD-10-PCS | Mod: ,,, | Performed by: COLON & RECTAL SURGERY

## 2023-08-14 PROCEDURE — 25000003 PHARM REV CODE 250: Performed by: STUDENT IN AN ORGANIZED HEALTH CARE EDUCATION/TRAINING PROGRAM

## 2023-08-14 PROCEDURE — 88305 TISSUE EXAM BY PATHOLOGIST: CPT | Mod: 26,,, | Performed by: STUDENT IN AN ORGANIZED HEALTH CARE EDUCATION/TRAINING PROGRAM

## 2023-08-14 PROCEDURE — 63600175 PHARM REV CODE 636 W HCPCS: Performed by: STUDENT IN AN ORGANIZED HEALTH CARE EDUCATION/TRAINING PROGRAM

## 2023-08-14 PROCEDURE — 44386 ENDOSCOPY BOWEL POUCH/BIOP: CPT | Performed by: COLON & RECTAL SURGERY

## 2023-08-14 PROCEDURE — 81025 URINE PREGNANCY TEST: CPT | Performed by: COLON & RECTAL SURGERY

## 2023-08-14 PROCEDURE — 37000009 HC ANESTHESIA EA ADD 15 MINS: Performed by: COLON & RECTAL SURGERY

## 2023-08-14 PROCEDURE — 88305 TISSUE EXAM BY PATHOLOGIST: CPT | Mod: 59 | Performed by: STUDENT IN AN ORGANIZED HEALTH CARE EDUCATION/TRAINING PROGRAM

## 2023-08-14 PROCEDURE — 44386 ENDOSCOPY BOWEL POUCH/BIOP: CPT | Mod: ,,, | Performed by: COLON & RECTAL SURGERY

## 2023-08-14 RX ORDER — PROPOFOL 10 MG/ML
VIAL (ML) INTRAVENOUS CONTINUOUS PRN
Status: DISCONTINUED | OUTPATIENT
Start: 2023-08-14 | End: 2023-08-14

## 2023-08-14 RX ORDER — PROPOFOL 10 MG/ML
VIAL (ML) INTRAVENOUS
Status: DISCONTINUED | OUTPATIENT
Start: 2023-08-14 | End: 2023-08-14

## 2023-08-14 RX ORDER — SODIUM CHLORIDE 9 MG/ML
INJECTION, SOLUTION INTRAVENOUS CONTINUOUS
Status: DISCONTINUED | OUTPATIENT
Start: 2023-08-14 | End: 2023-08-14 | Stop reason: HOSPADM

## 2023-08-14 RX ORDER — LIDOCAINE HYDROCHLORIDE 20 MG/ML
INJECTION INTRAVENOUS
Status: DISCONTINUED | OUTPATIENT
Start: 2023-08-14 | End: 2023-08-14

## 2023-08-14 RX ADMIN — PROPOFOL 250 MCG/KG/MIN: 10 INJECTION, EMULSION INTRAVENOUS at 11:08

## 2023-08-14 RX ADMIN — PROPOFOL 20 MG: 10 INJECTION, EMULSION INTRAVENOUS at 11:08

## 2023-08-14 RX ADMIN — PROPOFOL 70 MG: 10 INJECTION, EMULSION INTRAVENOUS at 11:08

## 2023-08-14 RX ADMIN — SODIUM CHLORIDE: 0.9 INJECTION, SOLUTION INTRAVENOUS at 11:08

## 2023-08-14 RX ADMIN — LIDOCAINE HYDROCHLORIDE 50 MG: 20 INJECTION INTRAVENOUS at 11:08

## 2023-08-14 NOTE — H&P
COLONOSCOPY HISTORY AND PHYSICAL EXAM    Procedure : Colonoscopy      INDICATIONS: Total proctocolectomy with ileal anal j pouch restorative proctectomy.  Ileostomy closure in 2013. Last pouchoscopy 2018.          Findings: Mild inflammation in the pouch body       Past Medical History:   Diagnosis Date    Ulcerative colitis      Sedation Problems: NO  Family History   Problem Relation Age of Onset    Ulcerative colitis Neg Hx     Crohn's disease Neg Hx     Colon cancer Neg Hx     Celiac disease Neg Hx     Cirrhosis Neg Hx     Colon polyps Neg Hx     Cystic fibrosis Neg Hx     Esophageal cancer Neg Hx     Hemochromatosis Neg Hx     Inflammatory bowel disease Neg Hx     Irritable bowel syndrome Neg Hx     Liver cancer Neg Hx     Liver disease Neg Hx     Rectal cancer Neg Hx     Stomach cancer Neg Hx     Leno's disease Neg Hx     Lymphoma Neg Hx     Tuberculosis Neg Hx     Scleroderma Neg Hx     Rheum arthritis Neg Hx     Multiple sclerosis Neg Hx     Melanoma Neg Hx     Lupus Neg Hx     Psoriasis Neg Hx     Skin cancer Neg Hx      Fam Hx of Sedation Problems: NO  Social History     Socioeconomic History    Marital status:    Tobacco Use    Smoking status: Never    Smokeless tobacco: Never   Substance and Sexual Activity    Alcohol use: No     Alcohol/week: 0.0 standard drinks of alcohol    Drug use: No       Review of Systems - Negative except   Respiratory ROS: no dyspnea  Cardiovascular ROS: no exertional chest pain  Gastrointestinal ROS: NO abdominal discomfort,  NO rectal bleeding  Musculoskeletal ROS: no muscular pain  Neurological ROS: no recent stroke    Physical Exam:  /76   Pulse 98   Temp 98.1 °F (36.7 °C)   Resp 15   Ht 5' (1.524 m)   Wt 92.5 kg (204 lb)   SpO2 99%   Breastfeeding No   BMI 39.84 kg/m²   General: no distress  Head: normocephalic  Mallampati Score   Neck: supple, symmetrical, trachea midline  Lungs:  normal respiratory effort  Heart: regular rate and  rhythm  Abdomen: soft, non-tender non-distented; bowel sounds normal; no masses,  no organomegaly  Extremities: no cyanosis or edema, or clubbing    ASA:  II    PLAN  COLONOSCOPY.    SedationPlan :MAC    The details of the procedure, the possible need for biopsy or polypectomy and the potential risks including bleeding, perforation, missed polyps were discussed in detail.

## 2023-08-14 NOTE — TRANSFER OF CARE
Anesthesia Transfer of Care Note    Patient: Cara Manriquez    Procedure(s) Performed: Procedure(s) (LRB):  SIGMOIDOSCOPY, FLEXIBLE (N/A)    Patient location: GI    Anesthesia Type: general    Transport from OR: Transported from OR on room air with adequate spontaneous ventilation    Post pain: adequate analgesia    Post assessment: no apparent anesthetic complications    Post vital signs: stable    Level of consciousness: awake    Nausea/Vomiting: no nausea/vomiting    Complications: none    Transfer of care protocol was followed      Last vitals:   Visit Vitals  /76   Pulse 98   Temp 36.7 °C (98.1 °F)   Resp 15   Ht 5' (1.524 m)   Wt 92.5 kg (204 lb)   SpO2 99%   Breastfeeding No   BMI 39.84 kg/m²

## 2023-08-14 NOTE — ANESTHESIA PREPROCEDURE EVALUATION
2023  Cara Manriquez is a 43 y.o., female.    Patient Active Problem List   Diagnosis    Pouchitis    Diarrhea     Past Medical History:   Diagnosis Date    Ulcerative colitis      Past Surgical History:   Procedure Laterality Date     x 2      COLON SURGERY      lap assist proctolectomy, j-pouch, diverting ileostomy    COLON SURGERY  9/10/13    ileostomy takedown    COLONOSCOPY      05    TUBAL LIGATION             Pre-op Assessment    I have reviewed the Patient Summary Reports.     I have reviewed the Nursing Notes. I have reviewed the NPO Status.   I have reviewed the Medications.     Review of Systems  Anesthesia Hx:  No problems with previous Anesthesia  Denies Family Hx of Anesthesia complications.   Denies Personal Hx of Anesthesia complications.   Social:  Non-Smoker, No Alcohol Use    Cardiovascular:   Exercise tolerance: good    Hepatic/GI:   PUD,  Esophageal / Stomach Disorders Peptic Ulcer Disease        Physical Exam  General: Well nourished, Cooperative, Alert and Oriented    Airway:  Mallampati: III / II  Mouth Opening: Normal  TM Distance: Normal  Tongue: Normal  Neck ROM: Normal ROM    Dental:  Intact, Retainer, Caps / Implants    Chest/Lungs:  Clear to auscultation, Normal Respiratory Rate    Heart:  Rate: Normal  Rhythm: Regular Rhythm  Sounds: Normal    Abdomen:  Normal, Soft, Nontender        Anesthesia Plan  Type of Anesthesia, risks & benefits discussed:    Anesthesia Type: Gen Natural Airway  Intra-op Monitoring Plan: Standard ASA Monitors  Post Op Pain Control Plan: multimodal analgesia  Induction:  IV  Informed Consent: Informed consent signed with the Patient and all parties understand the risks and agree with anesthesia plan.  All questions answered. Patient consented to blood products? No  ASA Score: 2  Day of Surgery Review of History & Physical: H&P Update  referred to the surgeon/provider.    Ready For Surgery From Anesthesia Perspective.     .

## 2023-08-14 NOTE — [employer]ATION PATIENT INSTRUCTIONS
Discharge Summary/Instructions after an Endoscopic Procedure  Patient Name: Cara Manriquez  Patient MRN: [id number]  Patient YOB: 1980  Monday, August 14, 2023  Sánchez Barcenas MD  Dear patient,  As a result of recent federal legislation (The Federal Cures Act), you may   receive lab or pathology results from your procedure in your [facility]   account before your physician is able to contact you. Your physician or   their representative will relay the results to you with their   recommendations at their soonest availability.  Thank you,  RESTRICTIONS:  During your procedure today, you received medications for sedation.  These   medications may affect your judgment, balance and coordination.  Therefore,   for 24 hours, you have the following restrictions:   - DO NOT drive a car, operate machinery, make legal/financial decisions,   sign important papers or drink alcohol.    ACTIVITY:  Today: no heavy lifting, straining or running due to procedural   sedation/anesthesia.  The following day: return to full activity including work.  DIET:  Eat and drink normally unless instructed otherwise.     TREATMENT FOR COMMON SIDE EFFECTS:  - Mild abdominal pain, nausea, belching, bloating or excessive gas:  rest,   eat lightly and use a heating pad.  - Sore Throat: treat with throat lozenges and/or gargle with warm salt   water.  - Because air was used during the procedure, expelling large amounts of air   from your rectum or belching is normal.  - If a bowel prep was taken, you may not have a bowel movement for 1-3 days.    This is normal.  SYMPTOMS TO WATCH FOR AND REPORT TO YOUR PHYSICIAN:  1. Abdominal pain or bloating, other than gas cramps.  2. Chest pain.  3. Back pain.  4. Signs of infection such as: chills or fever occurring within 24 hours   after the procedure.  5. Rectal bleeding, which would show as bright red, maroon, or black stools.   (A tablespoon of blood from the rectum is not serious, especially if    hemorrhoids are present.)  6. Vomiting.  7. Weakness or dizziness.  GO DIRECTLY TO THE NEAREST EMERGENCY ROOM IF YOU HAVE ANY OF THE FOLLOWING:      Difficulty breathing              Chills and/or fever over 101 F   Persistent vomiting and/or vomiting blood   Severe abdominal pain   Severe chest pain   Black, tarry stools   Bleeding- more than one tablespoon   Any other symptom or condition that you feel may need urgent attention  Your doctor recommends these additional instructions:  If any biopsies were taken, your doctors clinic will contact you in 1 to 2   weeks with any results.  - The patient will be observed post-procedure, until all discharge criteria   are met.   - Patient has a contact number available for emergencies.  The signs and   symptoms of potential delayed complications were discussed with the   patient.  Return to normal activities tomorrow.  Written discharge   instructions were provided to the patient.   - Resume regular diet indefinitely.   - Continue present medications.   - Repeat post-surgical lower GI endoscopy in 3 years for surveillance.   - Discharge patient to home (ambulatory).  For questions, problems or results please call your physician - Sánchez Barcenas MD at Work:  [phone].  [facility], EMERGENCY ROOM PHONE NUMBER: [phone]  IF A COMPLICATION OR EMERGENCY SITUATION ARISES AND YOU ARE UNABLE TO REACH   YOUR PHYSICIAN - GO DIRECTLY TO THE EMERGENCY ROOM.  Sánchez Barcenas MD  8/14/2023 11:30:43 AM  This report has been verified and signed electronically.  Dear patient,  As a result of recent federal legislation (The Federal Cures Act), you may   receive lab or pathology results from your procedure in your [facility]   account before your physician is able to contact you. Your physician or   their representative will relay the results to you with their   recommendations at their soonest availability.  Thank you,  PROVATION

## 2023-08-14 NOTE — ANESTHESIA POSTPROCEDURE EVALUATION
Anesthesia Post Evaluation    Patient: Cara Manriquez    Procedure(s) Performed: Procedure(s) (LRB):  SIGMOIDOSCOPY, FLEXIBLE (N/A)    Final Anesthesia Type: general      Patient location during evaluation: PACU  Patient participation: Yes- Able to Participate  Level of consciousness: awake and alert and awake  Post-procedure vital signs: reviewed and stable  Pain management: adequate  Airway patency: patent    PONV status at discharge: No PONV  Anesthetic complications: no      Cardiovascular status: blood pressure returned to baseline  Respiratory status: unassisted and spontaneous ventilation  Hydration status: euvolemic  Follow-up not needed.          Vitals Value Taken Time   /75 08/14/23 1147   Temp 36.5 °C (97.7 °F) 08/14/23 1132   Pulse 57 08/14/23 1147   Resp 16 08/14/23 1147   SpO2 98 % 08/14/23 1147         No case tracking events are documented in the log.      Pain/Maureen Score: Maureen Score: 9 (8/14/2023 11:33 AM)

## 2023-08-16 LAB
FINAL PATHOLOGIC DIAGNOSIS: NORMAL
GROSS: NORMAL
Lab: NORMAL

## 2024-06-21 ENCOUNTER — LAB VISIT (OUTPATIENT)
Dept: LAB | Facility: HOSPITAL | Age: 44
End: 2024-06-21
Payer: COMMERCIAL

## 2024-06-21 ENCOUNTER — TELEPHONE (OUTPATIENT)
Dept: SURGERY | Facility: CLINIC | Age: 44
End: 2024-06-21
Payer: COMMERCIAL

## 2024-06-21 DIAGNOSIS — R19.7 WATERY DIARRHEA: ICD-10-CM

## 2024-06-21 DIAGNOSIS — R19.7 WATERY DIARRHEA: Primary | ICD-10-CM

## 2024-06-21 PROCEDURE — 87449 NOS EACH ORGANISM AG IA: CPT | Mod: 91 | Performed by: NURSE PRACTITIONER

## 2024-06-21 PROCEDURE — 87045 FECES CULTURE AEROBIC BACT: CPT | Performed by: NURSE PRACTITIONER

## 2024-06-21 PROCEDURE — 87324 CLOSTRIDIUM AG IA: CPT | Performed by: NURSE PRACTITIONER

## 2024-06-21 PROCEDURE — 87046 STOOL CULTR AEROBIC BACT EA: CPT | Performed by: NURSE PRACTITIONER

## 2024-06-21 PROCEDURE — 87329 GIARDIA AG IA: CPT | Performed by: NURSE PRACTITIONER

## 2024-06-21 PROCEDURE — 87449 NOS EACH ORGANISM AG IA: CPT | Performed by: NURSE PRACTITIONER

## 2024-06-21 PROCEDURE — 87209 SMEAR COMPLEX STAIN: CPT | Performed by: NURSE PRACTITIONER

## 2024-06-21 PROCEDURE — 87427 SHIGA-LIKE TOXIN AG IA: CPT | Performed by: NURSE PRACTITIONER

## 2024-06-21 NOTE — TELEPHONE ENCOUNTER
----- Message from Jolene Desir sent at 6/21/2024  9:25 AM CDT -----  Regarding: pt advice  Contact: [phone]  Pt calling to speak to nurse regarding medication. Pt is experiencing diarrhea, nausea and abdominal cramps, drainage. Pls call

## 2024-06-21 NOTE — TELEPHONE ENCOUNTER
----- Message from Sheridan Nina RN sent at 6/21/2024  9:50 AM CDT -----  Regarding: FW: pt advice  Contact: [phone]  Hey - I just spoke with her. She has been having watery diarrhea for 5 weeks, cramping, nausea. Denies any fever. Denies formed stools, changes in medication.    She lives in [place]. Please advise.  ----- Message -----  From: Jolene Desir  Sent: 6/21/2024   9:27 AM CDT  To: Swapna GOTTLIEB Staff  Subject: pt advice                                        Pt calling to speak to nurse regarding medication. Pt is experiencing diarrhea, nausea and abdominal cramps, drainage. Pls call

## 2024-06-21 NOTE — TELEPHONE ENCOUNTER
Spoke with pt. This all started after she ate at [employer]. She does not have anyone close to home to check stool studies. She is going to come to [facility] to turn in her stool.

## 2024-06-21 NOTE — TELEPHONE ENCOUNTER
Spoke with patient regarding watery diarrhea for 5 weeks, cramping, nausea. Denies any fever. Denies formed stools, changes in medication. Patient states that she is concerned about pouchitis. Message sent to NP and Dr. Barcenas to advise. Explained that I will call her back once I hear back from NP.

## 2024-06-22 LAB
C DIFF GDH STL QL: NEGATIVE
C DIFF TOX A+B STL QL IA: NEGATIVE

## 2024-06-23 LAB
CRYPTOSP AG STL QL IA: NEGATIVE
E COLI SXT1 STL QL IA: NEGATIVE
E COLI SXT2 STL QL IA: NEGATIVE
G LAMBLIA AG STL QL IA: NEGATIVE

## 2024-06-24 LAB
BACTERIA STL CULT: ABNORMAL
BACTERIA STL CULT: ABNORMAL

## 2024-06-25 ENCOUNTER — TELEPHONE (OUTPATIENT)
Dept: SURGERY | Facility: CLINIC | Age: 44
End: 2024-06-25
Payer: COMMERCIAL

## 2024-06-25 DIAGNOSIS — R19.7 WATERY DIARRHEA: Primary | ICD-10-CM

## 2024-06-25 RX ORDER — AZITHROMYCIN 500 MG/1
500 TABLET, FILM COATED ORAL DAILY
Qty: 3 TABLET | Refills: 0 | Status: SHIPPED | OUTPATIENT
Start: 2024-06-25 | End: 2024-06-28

## 2024-06-25 RX ORDER — AZITHROMYCIN 500 MG/1
500 TABLET, FILM COATED ORAL DAILY
Qty: 3 TABLET | Refills: 0 | Status: SHIPPED | OUTPATIENT
Start: 2024-06-25 | End: 2024-06-25

## 2024-06-25 NOTE — TELEPHONE ENCOUNTER
Spoke with pt , we dicussed pos stool tests. Treating with azithromycin. All questions and concerns addressed

## 2024-06-26 LAB — O+P STL MICRO: NORMAL

## 2024-06-28 ENCOUNTER — PATIENT MESSAGE (OUTPATIENT)
Dept: SURGERY | Facility: CLINIC | Age: 44
End: 2024-06-28
Payer: COMMERCIAL

## 2024-09-17 ENCOUNTER — TELEPHONE (OUTPATIENT)
Dept: SURGERY | Facility: CLINIC | Age: 44
End: 2024-09-17
Payer: COMMERCIAL

## 2024-09-17 NOTE — TELEPHONE ENCOUNTER
----- Message from Sánchez Barcenas MD sent at 9/17/2024  4:33 PM CDT -----  Yep, that's fine  ----- Message -----  From: Yoselin Rojas LPN  Sent: 9/17/2024   1:47 PM CDT  To: Sánchez Barcenas MD    Kinsey wanted to know if it is OK for her to start taking the injection Zepbound.  Her primaryis wanting her to start this med due to obesity and elevated BP. Pt was concerned because she has a j-pouch.   Yoselin  ----- Message -----  From: Chantel Lyons  Sent: 9/17/2024   1:26 PM CDT  To: Swapna GOTTLIEB Staff    Name of Who is Calling:KINSEY POP [[id number]]        What is the request in detail:Pt would like a callback from the office in regards to discussing medication questions, pt wants to start a new medication but wants to discuss to make sure it's ok after having sx.Please advise thank you       Can the clinic reply by [facility]:NO        What Number to Call Back if not in [facility]:Telephone Information:  Mobile          [phone]

## 2025-03-03 ENCOUNTER — TELEPHONE (OUTPATIENT)
Dept: GASTROENTEROLOGY | Facility: CLINIC | Age: 45
End: 2025-03-03
Payer: COMMERCIAL

## 2025-04-23 ENCOUNTER — TELEPHONE (OUTPATIENT)
Dept: SURGERY | Facility: CLINIC | Age: 45
End: 2025-04-23
Payer: COMMERCIAL

## 2025-04-23 NOTE — TELEPHONE ENCOUNTER
Spoke with patient, states that she had a bout of diarrhea about a week ago for one day. A few days later she starting feeling like sharp throbbing pain in rectum, patient does have a J-pouch. Very little bleeding at times when she wipes, very sensitive so she blots with the tissue paper. Bowel movements in size and frequency. Patient has been using preparation H, Sitz baths and using Tylenol. Pain waxes and wanes. Appt scheduled. Will message Dr Barcenas.

## 2025-04-23 NOTE — TELEPHONE ENCOUNTER
----- Message from Garrett sent at 4/23/2025  4:33 PM CDT -----  Regarding: Nurse Call requested  Contact: [phone]  Hi,Who called:The pt Reason:Hi, pt called to request a call for the nurse to discuss her anal throbbing pain since Saturday. Please call her at [phone] to discuss. Provider's name:Dr. BarcenasAdditional Information:Thank you.

## 2025-04-24 RX ORDER — CIPROFLOXACIN 500 MG/1
500 TABLET ORAL EVERY 12 HOURS
Qty: 28 TABLET | Refills: 0 | Status: SHIPPED | OUTPATIENT
Start: 2025-04-24 | End: 2025-05-08

## 2025-04-25 ENCOUNTER — TELEPHONE (OUTPATIENT)
Dept: SURGERY | Facility: CLINIC | Age: 45
End: 2025-04-25
Payer: COMMERCIAL

## 2025-04-25 NOTE — TELEPHONE ENCOUNTER
----- Message from Sánchez Barcenas MD sent at 4/24/2025 12:31 PM CDT -----  I sent a scrip for cipro she can start on today  ----- Message -----  From: Heather Reyes RN  Sent: 4/23/2025   4:55 PM CDT  To: Yoselin Rojas LPN; Sánchez Barcenas MD    Spoke with patient, states that she had a bout of diarrhea about a week ago for one day. A few days later she starting feeling like sharp throbbing pain in rectum, patient does have a J-pouch. Very little bleeding at times when she wipes, very sensitive so she blots with the tissue paper. Bowel movements have decreased in size and frequency. Patient has been using preparation H, Sitz baths and using Tylenol. Pain waxes and wanes. I scheduled an appt for next Tuesday, but I wasn't sure if you would want any other interventions prior to her visit in regards to her J Pouch. I am off Thursday so I included Yoselin in this message.

## 2025-04-25 NOTE — TELEPHONE ENCOUNTER
Spoke with patient, states that she has been having yellow diarrhea. She is still having pain and swelling in the rectum. Informed patient that Dr Barcenas sent in a prescription for Ciprofloxacin to her pharmacy. Patient currently has an appt scheduled with Dr Barcenas for next Tuesday. Instructed patient to call in with an update on symptoms on Monday. Will inquire with Dr Barcenas about appt, if symptoms have resolved, does the patient  still need upcoming appt.

## 2025-04-25 NOTE — TELEPHONE ENCOUNTER
Spoke with patient, informed her that I spoke with Dr Barcenas and confirmed that she should call on Monday to give an update on her condition. If she is feeling better, we can cancel the appt for the next day and schedule a 1 month follow up. 1 month follow up scheduled. Patient would like her pharmacy changed to [facility] in [place].

## 2025-04-25 NOTE — TELEPHONE ENCOUNTER
Called pt to confirm appt with Dr. Barcenas on 4/25/25 at 8:40 AM  in [facility] ([facility], 4th floor). Pt expressed understanding of appt's date, time and location. Pt stated she will be there.

## 2025-04-28 ENCOUNTER — TELEPHONE (OUTPATIENT)
Dept: SURGERY | Facility: CLINIC | Age: 45
End: 2025-04-28
Payer: COMMERCIAL

## 2025-04-28 NOTE — TELEPHONE ENCOUNTER
Spoke with patient. States that her pain has decreased. She had a bowel movement for the first time in 40 hours and she states that she has been eating regularly. States that she had to strain a little to push bowel movement out. Patient has a J pouch and bowel movements are usually several times a day. Encouraged patient to keep appt for tomorrow. Patient also has 1 month follow up scheduled.

## 2025-04-28 NOTE — TELEPHONE ENCOUNTER
----- Message from Nurse Wyatt sent at 4/28/2025  8:21 AM CDT -----  Regarding: FW: Consult Advisory  Contact: KINSEY POP [[id number]]    ----- Message -----  From: Ifeoma Gu  Sent: 4/28/2025   8:17 AM CDT  To: Swapna GOTTLIEB Staff  Subject: Consult Advisory                                 CONSULT/ADVISORYName of Caller: KINSEY POP [[id number]]Contact Preference:  [phone] of Call:  Pt wants to speak with Nurse Romero concerning how she is doing.  Please call pt.  Thank you

## 2025-04-29 ENCOUNTER — OFFICE VISIT (OUTPATIENT)
Dept: SURGERY | Facility: CLINIC | Age: 45
End: 2025-04-29
Payer: COMMERCIAL

## 2025-04-29 VITALS
HEART RATE: 66 BPM | BODY MASS INDEX: 35.9 KG/M2 | SYSTOLIC BLOOD PRESSURE: 123 MMHG | HEIGHT: 64 IN | DIASTOLIC BLOOD PRESSURE: 83 MMHG | WEIGHT: 210.31 LBS

## 2025-04-29 DIAGNOSIS — K60.2 ANAL FISSURE: Primary | ICD-10-CM

## 2025-04-29 PROCEDURE — 99999 PR PBB SHADOW E&M-EST. PATIENT-LVL III: CPT | Mod: PBBFAC,,, | Performed by: COLON & RECTAL SURGERY

## 2025-04-29 PROCEDURE — 1159F MED LIST DOCD IN RCRD: CPT | Mod: CPTII,S$GLB,, | Performed by: COLON & RECTAL SURGERY

## 2025-04-29 PROCEDURE — 3079F DIAST BP 80-89 MM HG: CPT | Mod: CPTII,S$GLB,, | Performed by: COLON & RECTAL SURGERY

## 2025-04-29 PROCEDURE — 3074F SYST BP LT 130 MM HG: CPT | Mod: CPTII,S$GLB,, | Performed by: COLON & RECTAL SURGERY

## 2025-04-29 PROCEDURE — 3008F BODY MASS INDEX DOCD: CPT | Mod: CPTII,S$GLB,, | Performed by: COLON & RECTAL SURGERY

## 2025-04-29 PROCEDURE — 99213 OFFICE O/P EST LOW 20 MIN: CPT | Mod: S$GLB,,, | Performed by: COLON & RECTAL SURGERY

## 2025-04-29 NOTE — PROGRESS NOTES
This note was generated with the assistance of ambient listening technology. Verbal consent was obtained by the patient and accompanying visitor(s) for the recording of patient appointment to facilitate this note. I attest to having reviewed and edited the generated note for accuracy, though some syntax or spelling errors may persist. Please contact the author of this note for any clarification.  Subjective     Patient ID: Cara Manriquez is a 45 y.o. female.    Chief Complaint: Rectal Pain    History of Present Illness    CHIEF COMPLAINT:  Patient presents today for evaluation of anal pain, changes in bowel habits, and recent episodes of diarrhea followed by constipation.    INTERVAL HISTORY:  Patient reports chronic diarrhea starting 1 week prior to presentation, initially attributed to possible food poisoning, similar to a previous campylobacter infection. She developed progressive anal discomfort a few days after diarrhea onset, which evolved into significant sharp anal pain with burning and throbbing by Friday night, accompanied by anal paresthesia before starting Cipro.    She had a 40-hour period without bowel movements after Saturday evening, a significant deviation from her baseline of 5-7 daily bowel movements, and reports abdominal bloating and borborygmi during this time. When bowel function resumed, she passed a large volume stool and reports straining with bowel movement on the morning of the visit.    Currently, she has persistent anal sensitivity with visible perianal swelling and apparent anal stenosis. Cipro has provided partial symptom relief. Menses began during illness, limiting her ability to use sitz baths for comfort; a heating pad provided some relief.    She denies hematemesis, hematochezia, and radiation of pain to lower extremities.    MEDICATIONS:  - Cipro: Started recently for current condition  - Unspecified cream: Patient tried for current condition    PAST MEDICAL HISTORY:  -  "Campylobacter infection (past)    SOCIAL HISTORY:  [facility]    OB/GYN HISTORY:  - Age at menarche: Previously                 Objective     /83 (BP Location: Right arm, Patient Position: Sitting)   Pulse 66   Ht 5' 4" (1.626 m)   Wt 95.4 kg (210 lb 5.1 oz)   BMI 36.10 kg/m²   Well appearing  Anorectal: posterior midline fissure.  Tender to palpation.  Hypertonic resting tone.          Assessment and Plan     1. Anal fissure  -     diltiazem HCl (DILTIAZEM 2% - LIDOCAINE 5% CREAM); Apply 3 times a day  Dispense: 50 g; Refill: 1      Assessment & Plan    K60.2 Anal fissure, unspecified  K62.89 Other specified diseases of anus and rectum  K59.1 Functional diarrhea  K64.8 Other hemorrhoids  Z86.19 Personal history of other infectious and parasitic diseases    ANAL FISSURE:  - Patient presents with anal fissure: significant pain with bowel movements following chronic diarrhea.  - Posterior anal fissure with increased sphincter tone, affecting treatment approach.  - Started lidocaine ointment, to be applied to the affected anal area.  - Continue using heating pad, particularly where the bottom and thigh meet, to help relax pelvic floor muscles.    FUNCTIONAL DIARRHEA:  - Cipro provided symptom relief.    OTHER SPECIFIED DISEASES OF ANUS AND RECTUM:  - Deferred flexible sigmoidoscopy for pouchitis evaluation due to discomfort.    FOLLOW-UP:  - Plan to reassess in 1 month to determine if further intervention is necessary.  - Follow up visit in 1 month.           Video visit in 1 month.  If not improved - consider pouchoscopy with sedation.          No follow-ups on file.    This note was generated with the assistance of ambient listening technology. Verbal consent was obtained by the patient and accompanying visitor(s) for the recording of patient appointment to facilitate this note. I attest to having reviewed and edited the generated note for accuracy, though some syntax or spelling errors may persist. Please " contact the author of this note for any clarification.

## 2025-05-02 ENCOUNTER — TELEPHONE (OUTPATIENT)
Dept: SURGERY | Facility: CLINIC | Age: 45
End: 2025-05-02
Payer: COMMERCIAL

## 2025-05-06 ENCOUNTER — OFFICE VISIT (OUTPATIENT)
Dept: SURGERY | Facility: CLINIC | Age: 45
End: 2025-05-06
Payer: COMMERCIAL

## 2025-05-06 DIAGNOSIS — K60.2 ANAL FISSURE: Primary | ICD-10-CM

## 2025-05-06 PROCEDURE — 98013 SYNCH AUDIO-ONLY EST LOW 20: CPT | Mod: 93,,, | Performed by: COLON & RECTAL SURGERY

## 2025-05-06 NOTE — PROGRESS NOTES
The patient location is: [place]  The chief complaint leading to consultation is: Anal fissure    Visit type: audiovisual    Face to Face time with patient: 10 min  20 minutes of total time spent on the encounter, which includes face to face time and non-face to face time preparing to see the patient (eg, review of tests), Obtaining and/or reviewing separately obtained history, Documenting clinical information in the electronic or other health record, Independently interpreting results (not separately reported) and communicating results to the patient/family/caregiver, or Care coordination (not separately reported).         Each patient to whom he or she provides medical services by telemedicine is:  (1) informed of the relationship between the physician and patient and the respective role of any other health care provider with respect to management of the patient; and (2) notified that he or she may decline to receive medical services by telemedicine and may withdraw from such care at any time.    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  Saw her last week in clinic and she appeared to have an anal fissure.  Interestingly she has had a couple of periods of no bowel movement for >48 hours.  She is not change medications she has started eating less.  She has not used any laxatives in the past.  At the time of this conversation she had had another episode of no bowel movement for 48 hours and did have some pain with bowel movement at that time although overall it is improved.  We discussed adding some Benefiber and possibly Colace to her current regimen continue diltiazem ointment.  She is going to send me a note in the portal in the next week to let me know if her bowel movement is improved and we have set schedule another video visit for the end of this month.

## 2025-05-12 ENCOUNTER — PATIENT MESSAGE (OUTPATIENT)
Dept: SURGERY | Facility: CLINIC | Age: 45
End: 2025-05-12
Payer: COMMERCIAL

## 2025-05-23 ENCOUNTER — TELEPHONE (OUTPATIENT)
Dept: SURGERY | Facility: CLINIC | Age: 45
End: 2025-05-23
Payer: COMMERCIAL

## 2025-05-23 NOTE — TELEPHONE ENCOUNTER
Called pt to confirm virtual visit with Dr. Barcenas on 5/26/25 at 8:00 AM . Pt expressed understanding of appt's date, time and location (virtual). Pt stated she will be there.

## 2025-05-27 ENCOUNTER — OFFICE VISIT (OUTPATIENT)
Dept: SURGERY | Facility: CLINIC | Age: 45
End: 2025-05-27
Payer: COMMERCIAL

## 2025-05-27 ENCOUNTER — TELEPHONE (OUTPATIENT)
Dept: ENDOSCOPY | Facility: HOSPITAL | Age: 45
End: 2025-05-27
Payer: COMMERCIAL

## 2025-05-27 DIAGNOSIS — K91.850 POUCHITIS: Primary | ICD-10-CM

## 2025-05-27 PROCEDURE — 98004 SYNCH AUDIO-VIDEO EST SF 10: CPT | Mod: 95,,, | Performed by: COLON & RECTAL SURGERY

## 2025-05-27 NOTE — TELEPHONE ENCOUNTER
pouchoscopy  Received: Today  Sánchez Barcenas MD  P [facility] Sched[facility]    Order placed for pouchoscopy at [place]

## 2025-05-27 NOTE — PROGRESS NOTES
The patient location is: [place]  The chief complaint leading to consultation is: Change in bowel habits     Visit type: audiovisual    Face to Face time with patient: 10  15 minutes of total time spent on the encounter, which includes face to face time and non-face to face time preparing to see the patient (eg, review of tests), Obtaining and/or reviewing separately obtained history, Documenting clinical information in the electronic or other health record, Independently interpreting results (not separately reported) and communicating results to the patient/family/caregiver, or Care coordination (not separately reported).         Each patient to whom he or she provides medical services by telemedicine is:  (1) informed of the relationship between the physician and patient and the respective role of any other health care provider with respect to management of the patient; and (2) notified that he or she may decline to receive medical services by telemedicine and may withdraw from such care at any time.    Notes:     Subjective     Patient ID: Cara Manriquez is a 45 y.o. female.    Chief Complaint: Constipation    History of Present Illness    CHIEF COMPLAINT:  Patient presents today for follow-up related to anal fissure and ongoing bowel issues.    INTERVAL HISTORY:  Patient reports ongoing bowel dysfunction since anal fissure surgery, including dysdefecation with tenesmus despite the urge to defecate. Bowel frequency has increased to 5-6 times daily with nocturnal episodes. She has been exercising for 3 months, eliminated soda consumption, and restricts desserts to holidays.    Diarrhea occurred the night prior to the appointment. Anal sensitivity is noted with a burning sensation and a feeling of lacerations while wiping. Minimal hematochezia is observed, described as pink on tissue paper. She reports difficulty inserting cream due to anal sphincter tightness.    Current treatment includes nearly depleted  cream. Calmoseptine ointment was previously used post-surgery for burning sensations. She is currently taking Benefiber supplement.    She denies similar anal discomfort prior to fissure development in the last few years.    MEDICATIONS:  - Benefiber  - Calmoseptine: Used after surgery, not used recently  - Discontinued anal fissure cream: Patient is out of this medication         Objective              Assessment and Plan     1. Pouchitis  -     Case Request Endoscopy: ENDOSCOPY, POUCH, SMALL INTESTINE, DIAGNOSTIC      Assessment & Plan    K60.0 Acute anal fissure  K62.5 Hemorrhage of anus and rectum  R19.8 Other specified symptoms and signs involving the digestive system and abdomen  R10.10 Upper abdominal pain, unspecified  R19.4 Change in bowel habit    ACUTE ANAL FISSURE:  - Post-op anal fissure symptoms include straining, sensitivity, and occasional blood on tissue. Current symptoms are atypical for post-op course, warranting further investigation.  - Recommend transition from current cream to Calmoseptine for symptom management until further evaluation.  - Apply a thin layer of Calmoseptine ointment only to the outside of the anus, not internally.  - Discontinued current anal cream.    DIGESTIVE SYSTEM SYMPTOMS:  - Continue taking Benefiber as previously instructed.  - Ordered pouch endoscopy under sedation to assess current condition and determine if additional interventions are necessary.    FOLLOW-UP:  - Office will be in contact regarding pouch endoscopy appointment, expected within 2 weeks.  - Procedure may be scheduled at [facility] or [place] outpatient facility.             This note was generated with the assistance of ambient listening technology. Verbal consent was obtained by the patient and accompanying visitor(s) for the recording of patient appointment to facilitate this note. I attest to having reviewed and edited the generated note for accuracy, though some syntax or spelling errors may  persist. Please contact the author of this note for any clarification.

## 2025-05-27 NOTE — TELEPHONE ENCOUNTER
pouchoscopy  Received: Today  Sánchez Barcenas MD  P [facility] Endoscopy Schedulers    Order placed for pouchoscopy at [place]

## 2025-05-27 NOTE — TELEPHONE ENCOUNTER
Patient is scheduled for a Pouchoscopy on 5/29/2025 with Dr. TED Barcenas  Referral for procedure from Clinic visit

## 2025-05-27 NOTE — TELEPHONE ENCOUNTER
RE: pouchoscopy  Received: Today  Sánchez Barcenas MD Piglia, Ashley, RN  Clear liquids the day before. 2 enemas the day of  cbw          Previous Messages       ----- Message -----  From: Saskia Burch, RN  Sent: 5/27/2025  11:19 AM CDT  To: Sánchez Barcenas MD  Subject: RE: pouchoscopy                                  What prep would you like the pt to do ?  ----- Message -----  From: Sánchez Barcenas MD  Sent: 5/27/2025  10:25 AM CDT  To: [facility] Endoscopy Schedulers  Subject: pouchoscopy                                        Order placed for pouchoscopy at [place]

## 2025-05-28 ENCOUNTER — ANESTHESIA EVENT (OUTPATIENT)
Dept: ENDOSCOPY | Facility: HOSPITAL | Age: 45
End: 2025-05-28
Payer: COMMERCIAL

## 2025-05-28 NOTE — ANESTHESIA PREPROCEDURE EVALUATION
05/28/2025  Cara Manriquez is a 45 y.o., female.    Procedure: ENDOSCOPY, POUCH, SMALL INTESTINE, DIAGNOSTIC (N/A)       Problem List[1]    Past Medical History:   Diagnosis Date    Ulcerative colitis        ECHO: No results found for this or any previous visit.      There is no height or weight on file to calculate BMI.    Tobacco Use: Low Risk  (4/29/2025)    Patient History     Smoking Tobacco Use: Never     Smokeless Tobacco Use: Never     Passive Exposure: Not on file       Social History     Substance and Sexual Activity   Drug Use No        Alcohol Use: Not At Risk (4/29/2025)    AUDIT-C     Frequency of Alcohol Consumption: Never     Average Number of Drinks: Patient declined     Frequency of Binge Drinking: Never       Review of patient's allergies indicates:   Allergen Reactions    Flagyl [metronidazole] Other (See Comments)     Numbness, tingling--leg, hands, knees         Airway:  No value filed.    Pre-op Assessment    I have reviewed the Patient Summary Reports.     I have reviewed the Nursing Notes. I have reviewed the NPO Status.   I have reviewed the Medications.     Review of Systems  Anesthesia Hx:             Denies Family Hx of Anesthesia complications.    Denies Personal Hx of Anesthesia complications.                    Social:  Non-Smoker       Hematology/Oncology:  Hematology Normal   Oncology Normal                                   EENT/Dental:  EENT/Dental Normal           Cardiovascular:  Cardiovascular Normal                                              Pulmonary:  Pulmonary Normal                       Renal/:  Renal/ Normal                 Hepatic/GI:   PUD,                  Musculoskeletal:  Musculoskeletal Normal                Neurological:  Neurology Normal                                      Endocrine:  Endocrine Normal          Obesity / BMI >  30  Dermatological:  Skin Normal    Psych:  Psychiatric Normal                       Anesthesia Plan  Type of Anesthesia, risks & benefits discussed:    Anesthesia Type: Gen Natural Airway  Intra-op Monitoring Plan: Standard ASA Monitors  Post Op Pain Control Plan: multimodal analgesia  Induction:  IV  Informed Consent: Informed consent signed with the Patient and all parties understand the risks and agree with anesthesia plan.  All questions answered.   ASA Score: 2  Day of Surgery Review of History & Physical: H&P Update referred to the surgeon/provider.    Ready For Surgery From Anesthesia Perspective.     .           [1]   Patient Active Problem List  Diagnosis    Pouchitis    Diarrhea

## 2025-05-29 ENCOUNTER — HOSPITAL ENCOUNTER (OUTPATIENT)
Facility: HOSPITAL | Age: 45
Discharge: HOME | End: 2025-05-29
Attending: COLON & RECTAL SURGERY | Admitting: COLON & RECTAL SURGERY
Payer: COMMERCIAL

## 2025-05-29 ENCOUNTER — ANESTHESIA (OUTPATIENT)
Dept: ENDOSCOPY | Facility: HOSPITAL | Age: 45
End: 2025-05-29
Payer: COMMERCIAL

## 2025-05-29 VITALS
DIASTOLIC BLOOD PRESSURE: 72 MMHG | OXYGEN SATURATION: 100 % | RESPIRATION RATE: 18 BRPM | SYSTOLIC BLOOD PRESSURE: 117 MMHG | HEART RATE: 71 BPM | TEMPERATURE: 98 F

## 2025-05-29 DIAGNOSIS — K91.850 POUCHITIS: Primary | ICD-10-CM

## 2025-05-29 DIAGNOSIS — K91.850 ILEAL POUCHITIS: ICD-10-CM

## 2025-05-29 LAB
B-HCG UR QL: NEGATIVE
CTP QC/QA: YES

## 2025-05-29 PROCEDURE — 37000008 HC ANESTHESIA 1ST 15 MINUTES: Performed by: COLON & RECTAL SURGERY

## 2025-05-29 PROCEDURE — 25000003 PHARM REV CODE 250: Performed by: NURSE ANESTHETIST, CERTIFIED REGISTERED

## 2025-05-29 PROCEDURE — 99900035 HC TECH TIME PER 15 MIN (STAT)

## 2025-05-29 PROCEDURE — 44386 ENDOSCOPY BOWEL POUCH/BIOP: CPT | Mod: ,,, | Performed by: COLON & RECTAL SURGERY

## 2025-05-29 PROCEDURE — 63600175 PHARM REV CODE 636 W HCPCS: Performed by: NURSE ANESTHETIST, CERTIFIED REGISTERED

## 2025-05-29 PROCEDURE — 94761 N-INVAS EAR/PLS OXIMETRY MLT: CPT

## 2025-05-29 PROCEDURE — 44386 ENDOSCOPY BOWEL POUCH/BIOP: CPT | Performed by: COLON & RECTAL SURGERY

## 2025-05-29 PROCEDURE — 27201012 HC FORCEPS, HOT/COLD, DISP: Performed by: COLON & RECTAL SURGERY

## 2025-05-29 PROCEDURE — 81025 URINE PREGNANCY TEST: CPT | Performed by: COLON & RECTAL SURGERY

## 2025-05-29 PROCEDURE — 88305 TISSUE EXAM BY PATHOLOGIST: CPT | Mod: TC | Performed by: COLON & RECTAL SURGERY

## 2025-05-29 PROCEDURE — 37000009 HC ANESTHESIA EA ADD 15 MINS: Performed by: COLON & RECTAL SURGERY

## 2025-05-29 RX ORDER — SODIUM CHLORIDE 9 MG/ML
INJECTION, SOLUTION INTRAVENOUS CONTINUOUS
Status: DISCONTINUED | OUTPATIENT
Start: 2025-05-29 | End: 2025-05-29 | Stop reason: HOSPADM

## 2025-05-29 RX ORDER — LEVOTHYROXINE SODIUM 112 UG/1
112 TABLET ORAL
COMMUNITY

## 2025-05-29 RX ORDER — PROPOFOL 10 MG/ML
VIAL (ML) INTRAVENOUS CONTINUOUS PRN
Status: DISCONTINUED | OUTPATIENT
Start: 2025-05-29 | End: 2025-05-29

## 2025-05-29 RX ORDER — LIDOCAINE HYDROCHLORIDE 20 MG/ML
INJECTION INTRAVENOUS
Status: DISCONTINUED | OUTPATIENT
Start: 2025-05-29 | End: 2025-05-29

## 2025-05-29 RX ORDER — PROPOFOL 10 MG/ML
VIAL (ML) INTRAVENOUS
Status: DISCONTINUED | OUTPATIENT
Start: 2025-05-29 | End: 2025-05-29

## 2025-05-29 RX ADMIN — LIDOCAINE HYDROCHLORIDE 100 MG: 20 INJECTION INTRAVENOUS at 10:05

## 2025-05-29 RX ADMIN — GLYCOPYRROLATE 0.1 MG: 0.2 INJECTION, SOLUTION INTRAMUSCULAR; INTRAVENOUS at 10:05

## 2025-05-29 RX ADMIN — PROPOFOL 200 MCG/KG/MIN: 10 INJECTION, EMULSION INTRAVENOUS at 10:05

## 2025-05-29 RX ADMIN — PROPOFOL 100 MG: 10 INJECTION, EMULSION INTRAVENOUS at 10:05

## 2025-05-29 RX ADMIN — SODIUM CHLORIDE: 0.9 INJECTION, SOLUTION INTRAVENOUS at 10:05

## 2025-05-29 NOTE — ANESTHESIA POSTPROCEDURE EVALUATION
Anesthesia Post Evaluation    Patient: Cara Manriquez    Procedure(s) Performed: Procedure(s) (LRB):  ENDOSCOPY, POUCH, SMALL INTESTINE, DIAGNOSTIC (N/A)    Final Anesthesia Type: general      Patient location during evaluation: PACU  Patient participation: Yes- Able to Participate  Level of consciousness: awake and alert  Post-procedure vital signs: reviewed and stable  Pain management: adequate  Airway patency: patent    PONV status at discharge: No PONV  Anesthetic complications: no      Cardiovascular status: blood pressure returned to baseline  Respiratory status: unassisted  Hydration status: euvolemic            Vitals Value Taken Time   /72 05/29/25 11:20   Temp 36.7 °C (98 °F) 05/29/25 11:02   Pulse 67 05/29/25 11:21   Resp 18 05/29/25 11:20   SpO2 100 % 05/29/25 11:21   Vitals shown include unfiled device data.      Event Time   Out of Recovery 11:19:00         Pain/Maureen Score: Maureen Score: 10 (5/29/2025 11:19 AM)

## 2025-05-29 NOTE — TRANSFER OF CARE
Anesthesia Transfer of Care Note    Patient: Cara Manriquez    Procedure(s) Performed: Procedure(s) (LRB):  ENDOSCOPY, POUCH, SMALL INTESTINE, DIAGNOSTIC (N/A)    Patient location: PACU    Anesthesia Type: general    Transport from OR: Transported from OR on room air with adequate spontaneous ventilation    Post pain: adequate analgesia    Post assessment: no apparent anesthetic complications and tolerated procedure well    Post vital signs: stable    Level of consciousness: responds to stimulation and sedated    Nausea/Vomiting: no nausea/vomiting    Complications: none    Transfer of care protocol was followed    Last vitals: Visit Vitals  BP (!) 151/79 (BP Location: Left arm, Patient Position: Lying)   Pulse 100   Temp 36.1 °C (97 °F) (Temporal)   Resp 15   SpO2 100%   Breastfeeding No

## 2025-05-30 LAB
ESTROGEN SERPL-MCNC: NORMAL PG/ML
INSULIN SERPL-ACNC: NORMAL U[IU]/ML
LAB AP CLINICAL INFORMATION: NORMAL
LAB AP GROSS DESCRIPTION: NORMAL
LAB AP PERFORMING LOCATION(S): NORMAL
LAB AP REPORT FOOTNOTES: NORMAL

## 2025-06-17 ENCOUNTER — TELEPHONE (OUTPATIENT)
Dept: SURGERY | Facility: CLINIC | Age: 45
End: 2025-06-17
Payer: COMMERCIAL

## 2025-06-17 NOTE — TELEPHONE ENCOUNTER
Called pt to reschedule 6/24 virtual appt. Pt answered and chose 6/25 at 3:40 PM. Pt informed that appt's new information will be available in the portal once booked. Pt expressed understanding.

## 2025-06-28 ENCOUNTER — PATIENT MESSAGE (OUTPATIENT)
Dept: SURGERY | Facility: CLINIC | Age: 45
End: 2025-06-28
Payer: COMMERCIAL

## 2025-07-01 DIAGNOSIS — K60.2 ANAL FISSURE: Primary | ICD-10-CM
